# Patient Record
Sex: FEMALE | Race: BLACK OR AFRICAN AMERICAN | NOT HISPANIC OR LATINO | URBAN - METROPOLITAN AREA
[De-identification: names, ages, dates, MRNs, and addresses within clinical notes are randomized per-mention and may not be internally consistent; named-entity substitution may affect disease eponyms.]

---

## 2024-08-09 ENCOUNTER — PATIENT MESSAGE (OUTPATIENT)
Dept: UROGYNECOLOGY | Facility: CLINIC | Age: 69
End: 2024-08-09
Payer: MEDICARE

## 2024-08-13 ENCOUNTER — PATIENT MESSAGE (OUTPATIENT)
Dept: UROGYNECOLOGY | Facility: CLINIC | Age: 69
End: 2024-08-13
Payer: MEDICARE

## 2024-08-14 ENCOUNTER — OFFICE VISIT (OUTPATIENT)
Dept: UROGYNECOLOGY | Facility: CLINIC | Age: 69
End: 2024-08-14
Payer: MEDICARE

## 2024-08-14 VITALS
HEART RATE: 94 BPM | HEIGHT: 65 IN | BODY MASS INDEX: 48.32 KG/M2 | DIASTOLIC BLOOD PRESSURE: 77 MMHG | SYSTOLIC BLOOD PRESSURE: 154 MMHG | WEIGHT: 290 LBS

## 2024-08-14 DIAGNOSIS — N95.2 ATROPHIC VAGINITIS: ICD-10-CM

## 2024-08-14 DIAGNOSIS — N39.46 MIXED INCONTINENCE URGE AND STRESS: ICD-10-CM

## 2024-08-14 DIAGNOSIS — N81.3 UTEROVAGINAL PROLAPSE, COMPLETE: Primary | ICD-10-CM

## 2024-08-14 DIAGNOSIS — N81.10 PROLAPSE OF ANTERIOR VAGINAL WALL: ICD-10-CM

## 2024-08-14 DIAGNOSIS — R39.15 URINARY URGENCY: ICD-10-CM

## 2024-08-14 PROCEDURE — 3288F FALL RISK ASSESSMENT DOCD: CPT | Mod: CPTII,S$GLB,, | Performed by: OBSTETRICS & GYNECOLOGY

## 2024-08-14 PROCEDURE — 87086 URINE CULTURE/COLONY COUNT: CPT | Performed by: OBSTETRICS & GYNECOLOGY

## 2024-08-14 PROCEDURE — 99204 OFFICE O/P NEW MOD 45 MIN: CPT | Mod: S$GLB,,, | Performed by: OBSTETRICS & GYNECOLOGY

## 2024-08-14 PROCEDURE — 3077F SYST BP >= 140 MM HG: CPT | Mod: CPTII,S$GLB,, | Performed by: OBSTETRICS & GYNECOLOGY

## 2024-08-14 PROCEDURE — 3078F DIAST BP <80 MM HG: CPT | Mod: CPTII,S$GLB,, | Performed by: OBSTETRICS & GYNECOLOGY

## 2024-08-14 PROCEDURE — 99999 PR PBB SHADOW E&M-EST. PATIENT-LVL V: CPT | Mod: PBBFAC,,, | Performed by: OBSTETRICS & GYNECOLOGY

## 2024-08-14 PROCEDURE — 1159F MED LIST DOCD IN RCRD: CPT | Mod: CPTII,S$GLB,, | Performed by: OBSTETRICS & GYNECOLOGY

## 2024-08-14 PROCEDURE — 3008F BODY MASS INDEX DOCD: CPT | Mod: CPTII,S$GLB,, | Performed by: OBSTETRICS & GYNECOLOGY

## 2024-08-14 PROCEDURE — 1126F AMNT PAIN NOTED NONE PRSNT: CPT | Mod: CPTII,S$GLB,, | Performed by: OBSTETRICS & GYNECOLOGY

## 2024-08-14 PROCEDURE — 4010F ACE/ARB THERAPY RXD/TAKEN: CPT | Mod: CPTII,S$GLB,, | Performed by: OBSTETRICS & GYNECOLOGY

## 2024-08-14 PROCEDURE — 1101F PT FALLS ASSESS-DOCD LE1/YR: CPT | Mod: CPTII,S$GLB,, | Performed by: OBSTETRICS & GYNECOLOGY

## 2024-08-14 RX ORDER — IRON,CARBONYL/ASCORBIC ACID 100-250 MG
1 TABLET ORAL DAILY
COMMUNITY

## 2024-08-14 RX ORDER — LEVOTHYROXINE SODIUM 175 UG/1
1 TABLET ORAL DAILY
COMMUNITY
Start: 2023-11-09

## 2024-08-14 RX ORDER — CETIRIZINE HYDROCHLORIDE 10 MG/1
1 TABLET ORAL EVERY EVENING
COMMUNITY
Start: 2023-10-05

## 2024-08-14 RX ORDER — CYCLOBENZAPRINE HCL 10 MG
TABLET ORAL
COMMUNITY
Start: 2024-05-13

## 2024-08-14 RX ORDER — LOSARTAN POTASSIUM 50 MG/1
1 TABLET ORAL DAILY
COMMUNITY
Start: 2023-10-09

## 2024-08-14 RX ORDER — FLUTICASONE PROPIONATE 50 MCG
SPRAY, SUSPENSION (ML) NASAL
COMMUNITY
Start: 2024-02-05

## 2024-08-14 RX ORDER — CARVEDILOL 3.12 MG/1
TABLET ORAL
COMMUNITY

## 2024-08-14 RX ORDER — ALBUTEROL SULFATE 90 UG/1
INHALANT RESPIRATORY (INHALATION)
COMMUNITY
Start: 2024-06-28

## 2024-08-14 RX ORDER — METFORMIN HYDROCHLORIDE 500 MG/1
TABLET ORAL
COMMUNITY
Start: 2023-10-19

## 2024-08-14 RX ORDER — BLOOD-GLUCOSE METER
EACH MISCELLANEOUS
COMMUNITY
Start: 2024-08-05

## 2024-08-14 RX ORDER — BLOOD SUGAR DIAGNOSTIC
STRIP MISCELLANEOUS
COMMUNITY
Start: 2024-08-07

## 2024-08-14 RX ORDER — ASPIRIN 325 MG
1 TABLET, DELAYED RELEASE (ENTERIC COATED) ORAL
COMMUNITY

## 2024-08-14 RX ORDER — ACETAMINOPHEN AND CODEINE PHOSPHATE 300; 30 MG/1; MG/1
1 TABLET ORAL EVERY 6 HOURS PRN
COMMUNITY
Start: 2024-08-03

## 2024-08-14 RX ORDER — LANCETS
EACH MISCELLANEOUS
COMMUNITY
Start: 2024-03-14

## 2024-08-14 RX ORDER — ATORVASTATIN CALCIUM 20 MG/1
TABLET, FILM COATED ORAL
COMMUNITY
Start: 2024-02-08

## 2024-08-14 RX ORDER — FUROSEMIDE 20 MG/1
TABLET ORAL
COMMUNITY

## 2024-08-14 NOTE — PROGRESS NOTES
Subjective:       Patient ID: Felicity Culp is a 68 y.o. female.    Chief Complaint:  Vaginal Prolapse        History of Present Illness  HPI 68 y.o.  female No obstetric history on file.   has no past medical history on file.    Refereed by Dr. Patel for evaluation of prolapse which has been an issue for the past three or 4 years.     1)  UI:  (+) ONI  (+) UUI  -  (+) pads:.  Daytime frequency: Q 4 -5 hours.  Nocturia: yes 3-4 trines :    (-) dysuria-  (--) hematuria,  (--) frequent UTIs.  (+) incomplete bladder emptying.     2)  POP:  .   Symptoms:(+)  .  (--) vaginal bleeding. (--) vaginal discharge. (--) sexually active.  (+) dyspareunia when she was sexually active.  (--)  Vaginal dryness.  (--) vaginal estrogen use.     3)  BM:  (+) constipation/straining.  (--) chronic diarrhea. (--) hematochezia.  (--) fecal incontinence.  (--) fecal smearing/urgency.  (--) incomplete evacuation.        GYN & OB History  No LMP recorded.   Date of Last Pap: No result found    OB History   No obstetric history on file.     OB     x 0.  C/s x 0.    Largest: 7 # 12 oz     GYN  Menarche:  11  Menstrual cycle:   Menopause:  50's ?  Hysterectomy:  No   Ovaries:  Removed   Tubal ligation:  No  Other abdominal surgeries: n/a      No past medical history on file.  Past Surgical History:   Procedure Laterality Date    APPENDECTOMY  2005    CARPAL TUNNEL RELEASE      SPINAL FUSION         Review of Systems  Review of Systems   Constitutional: Negative.  Negative for activity change, appetite change, chills, diaphoresis, fatigue, fever and unexpected weight change.   HENT: Negative.     Eyes: Negative.    Respiratory: Negative.  Negative for cough.    Cardiovascular: Negative.    Gastrointestinal:  Positive for constipation. Negative for abdominal distention, abdominal pain, anal bleeding, blood in stool, diarrhea, nausea, rectal pain and vomiting.   Endocrine: Negative.    Genitourinary:  Positive for difficulty urinating.  Negative for decreased urine volume, dyspareunia, dysuria, enuresis, flank pain, frequency, genital sores, hematuria, menstrual problem, pelvic pain, urgency, vaginal bleeding, vaginal discharge and vaginal pain.        Prolapse  + vaginal bulge   +vaginal pressure    Musculoskeletal:  Negative for back pain.   Skin:  Negative for color change, pallor, rash and wound.   Allergic/Immunologic: Negative for environmental allergies, food allergies and immunocompromised state.   Hematological:  Negative for adenopathy. Does not bruise/bleed easily.   Psychiatric/Behavioral:  Negative for agitation, behavioral problems, confusion and sleep disturbance.            Objective:     Physical Exam   Constitutional: She is oriented to person, place, and time. She appears well-developed.   HENT:   Head: Normocephalic and atraumatic.   Eyes: Conjunctivae and EOM are normal.   Cardiovascular: Normal rate, regular rhythm, S1 normal, S2 normal, normal heart sounds and intact distal pulses.   Pulmonary/Chest: Effort normal and breath sounds normal. She exhibits no tenderness.   Abdominal: Soft. Bowel sounds are normal. She exhibits no distension and no mass. There is no splenomegaly or hepatomegaly. There is no abdominal tenderness. There is no rigidity, no rebound and no guarding. No hernia.   Genitourinary: Pelvic exam was performed with patient supine. Rectum normal, vagina normal, uterus normal, cervix normal, skenes normal and bartholins normal. Right labia normal and left labia normal. Urethra exhibits hypermobility. Urethra exhibits no urethral caruncle, no urethral diverticulum and no urethral mass. Right bartholin is not enlarged and not tender. Left bartholin is not enlarged and not tender. Rectal exam shows resting tone normal and active tone normal. Rectal exam shows no external hemorrhoid, no fissure, no tenderness, anal tone normal and no dovetailing. Guaiac negative stool. There is a rectocele, a cystocele, unspecified  prolapse of vaginal walls and atrophy in the vagina. No foreign body, tenderness, bleeding, fistula, mesh exposure or lavator tenderness in the vagina. Right adnexum displays no mass and no tenderness. Left adnexum displays no mass and no tenderness. Cervix exhibits no motion tenderness and no discharge. Uterus is not tender and not experiencing uterine prolapse.   PVR: 150 ML  Empty cough stress test: Negative.  Kegel: 1/5    POP-Q  Aa: 0 Ba: 4 C: 4   GH: 5 PB: 3 TVL: 9   Ap: 0 Bp: 0 D: -2                      Genitourinary Comments: Cervix is very bulky, making the vaginal approach challenging      Musculoskeletal:         General: Normal range of motion.      Cervical back: Normal range of motion and neck supple.   Neurological: She is alert and oriented to person, place, and time. She has normal strength, normal reflexes and intact cranial nerves (2-12). Cranial nerves II through XII intact. No cranial nerve deficit.   Skin: Skin is warm and dry.   Psychiatric: She has a normal mood and affect. Her speech is normal and behavior is normal. Judgment normal.              Assessment:        1. Uterovaginal prolapse, complete    2. Urinary urgency    3. Mixed incontinence urge and stress                Plan:    1. Prolapse: Stage 2 apical prolapse, Stage 2 anterior and posterior vaginal wall prolapse   --Pessary benefit discussed with patient, will schedule for pessary fitting- until surgery    --Daily pelvic floor exercises as assigned, Pelvic floor PT   --Non surgical options discussed with patient    --Surgical options discussed such as :  with apical suspension: SSF, USLS and SCP with mesh augmentation.  The possible need for an anterior or posterior colporrhaphy was discussed.  We discussed pro's and con's of a native tissue repair vs an augmented repair with mesh. Risks/ benefits/ alternatives/ succuss and failure rates were reviewed with the various surgical treatment options. Information packets were given  detailing surgical options.  She was also given web site information for: www.augs.org and www.ERPLYsforpfd.org and http://www.fda.gov/MedicalDevices/UroGynSurgicalMesh/default.htm to review the details of the surgical options discussed and the use of mesh in surgery. She will review the information given and we will discuss further at the follow up visit. Cervix is very bulky, making the the vaginal approach challenging but given her BMI, I recommend [facility] Transvaginal sonogram.     2.  Mixed urinary incontinence, urge > stress:   --Bladder diary for 3-7 days, write the number of times you go to the rest room and associated symptoms of urgency or leakage.   --Empty bladder every 3 hours.  Empty well: wait a minute, lean forward on toilet.    --Avoid dietary irritants (see sheet).  Keep diary x 3-5 days to determine your irritants.  --KEGELS: do 10 in AM and 10 in PM, holding each x 10 seconds.  When you feel urge to go, STOP, KEGEL, and when urge has passed, then go to bathroom.  --URGE: Myrbetriq 25 mg daily.  SE profile reviewed.    Takes 2-4 weeks to see if will have effect.  For dry mouth: get sour, sugar free lozenge or gum.    --STRESS:  Non surgical options: Pessary vs. Impressa vs Rivive - which represent urethral and bladder support devices; Surgical options including 1.  mid urethral sling; retropubic, transobturator vs single incision 2. Fascial slings 3. Cohn procedure 4. Periurethral bulking     3.   Urinary retention:  --Renal sonogram   --Double void and Crede maneuvers discussed  --Repeat PVR at the next visit        Approximately 60 minutes of total time spent in consult, 75 % in discussion. This includes face to face time and non-face to face time preparing to see the patient, obtaining and/or reviewing separately obtained history, documenting clinical information in the electronic or other health record, independently interpreting results (not  separately reported) and communicating results to the patient/family/caregiver, or care coordination (not separately reported).      Thank you for requesting consultation of your patient.  I look forward to participating in her care.    Tabby Hinojosa DO  Female Pelvic Medicine and Reconstructive Surgery  [facility] [place], [place]

## 2024-08-16 LAB — BACTERIA UR CULT: NO GROWTH

## 2024-08-19 ENCOUNTER — PATIENT MESSAGE (OUTPATIENT)
Dept: UROGYNECOLOGY | Facility: CLINIC | Age: 69
End: 2024-08-19
Payer: MEDICARE

## 2024-08-20 ENCOUNTER — HOSPITAL ENCOUNTER (OUTPATIENT)
Dept: RADIOLOGY | Facility: HOSPITAL | Age: 69
Discharge: HOME | End: 2024-08-20
Attending: OBSTETRICS & GYNECOLOGY
Payer: MEDICARE

## 2024-08-20 DIAGNOSIS — N81.3 UTEROVAGINAL PROLAPSE, COMPLETE: ICD-10-CM

## 2024-08-20 DIAGNOSIS — R39.15 URINARY URGENCY: ICD-10-CM

## 2024-08-20 PROCEDURE — 76770 US EXAM ABDO BACK WALL COMP: CPT | Mod: TC

## 2024-08-20 PROCEDURE — 71045 X-RAY EXAM CHEST 1 VIEW: CPT | Mod: 26,,, | Performed by: RADIOLOGY

## 2024-08-20 PROCEDURE — 76830 TRANSVAGINAL US NON-OB: CPT | Mod: 26,,, | Performed by: RADIOLOGY

## 2024-08-20 PROCEDURE — 76856 US EXAM PELVIC COMPLETE: CPT | Mod: 26,,, | Performed by: RADIOLOGY

## 2024-08-20 PROCEDURE — 76770 US EXAM ABDO BACK WALL COMP: CPT | Mod: 26,,, | Performed by: RADIOLOGY

## 2024-08-20 PROCEDURE — 76856 US EXAM PELVIC COMPLETE: CPT | Mod: TC

## 2024-08-20 PROCEDURE — 71045 X-RAY EXAM CHEST 1 VIEW: CPT | Mod: TC

## 2024-08-20 PROCEDURE — 76830 TRANSVAGINAL US NON-OB: CPT | Mod: TC

## 2024-09-03 ENCOUNTER — PATIENT MESSAGE (OUTPATIENT)
Dept: UROGYNECOLOGY | Facility: CLINIC | Age: 69
End: 2024-09-03
Payer: MEDICARE

## 2024-09-03 ENCOUNTER — TELEPHONE (OUTPATIENT)
Dept: UROGYNECOLOGY | Facility: CLINIC | Age: 69
End: 2024-09-03
Payer: MEDICARE

## 2024-09-03 DIAGNOSIS — R91.1 SOLITARY PULMONARY NODULE: Primary | ICD-10-CM

## 2024-09-05 ENCOUNTER — TELEPHONE (OUTPATIENT)
Dept: UROGYNECOLOGY | Facility: CLINIC | Age: 69
End: 2024-09-05
Payer: MEDICARE

## 2024-09-05 NOTE — TELEPHONE ENCOUNTER
Home number is not available , left message on cell to return call to set up pulmonary referral and schedule ct scan of chest.

## 2024-09-09 ENCOUNTER — HOSPITAL ENCOUNTER (OUTPATIENT)
Dept: RADIOLOGY | Facility: HOSPITAL | Age: 69
Discharge: HOME | End: 2024-09-09
Attending: OBSTETRICS & GYNECOLOGY
Payer: MEDICARE

## 2024-09-09 DIAGNOSIS — R91.1 SOLITARY PULMONARY NODULE: ICD-10-CM

## 2024-09-09 PROCEDURE — 71250 CT THORAX DX C-: CPT | Mod: TC

## 2024-09-09 PROCEDURE — 71250 CT THORAX DX C-: CPT | Mod: 26,,, | Performed by: RADIOLOGY

## 2024-09-18 ENCOUNTER — OFFICE VISIT (OUTPATIENT)
Dept: PULMONOLOGY | Facility: CLINIC | Age: 69
End: 2024-09-18
Payer: MEDICARE

## 2024-09-18 VITALS
BODY MASS INDEX: 48.82 KG/M2 | DIASTOLIC BLOOD PRESSURE: 80 MMHG | SYSTOLIC BLOOD PRESSURE: 134 MMHG | HEIGHT: 65 IN | HEART RATE: 75 BPM | OXYGEN SATURATION: 97 % | WEIGHT: 293 LBS

## 2024-09-18 DIAGNOSIS — R91.1 SOLITARY PULMONARY NODULE: ICD-10-CM

## 2024-09-18 PROCEDURE — 3288F FALL RISK ASSESSMENT DOCD: CPT | Mod: CPTII,S$GLB,, | Performed by: INTERNAL MEDICINE

## 2024-09-18 PROCEDURE — 99999 PR PBB SHADOW E&M-EST. PATIENT-LVL V: CPT | Mod: PBBFAC,,, | Performed by: INTERNAL MEDICINE

## 2024-09-18 PROCEDURE — 1159F MED LIST DOCD IN RCRD: CPT | Mod: CPTII,S$GLB,, | Performed by: INTERNAL MEDICINE

## 2024-09-18 PROCEDURE — 4010F ACE/ARB THERAPY RXD/TAKEN: CPT | Mod: CPTII,S$GLB,, | Performed by: INTERNAL MEDICINE

## 2024-09-18 PROCEDURE — 3079F DIAST BP 80-89 MM HG: CPT | Mod: CPTII,S$GLB,, | Performed by: INTERNAL MEDICINE

## 2024-09-18 PROCEDURE — 3075F SYST BP GE 130 - 139MM HG: CPT | Mod: CPTII,S$GLB,, | Performed by: INTERNAL MEDICINE

## 2024-09-18 PROCEDURE — 3008F BODY MASS INDEX DOCD: CPT | Mod: CPTII,S$GLB,, | Performed by: INTERNAL MEDICINE

## 2024-09-18 PROCEDURE — 99203 OFFICE O/P NEW LOW 30 MIN: CPT | Mod: S$GLB,,, | Performed by: INTERNAL MEDICINE

## 2024-09-18 PROCEDURE — 1101F PT FALLS ASSESS-DOCD LE1/YR: CPT | Mod: CPTII,S$GLB,, | Performed by: INTERNAL MEDICINE

## 2024-09-18 RX ORDER — CLOPIDOGREL BISULFATE 75 MG/1
75 TABLET ORAL
COMMUNITY

## 2024-09-18 RX ORDER — ISOPROPYL ALCOHOL 70 ML/100ML
SWAB TOPICAL
COMMUNITY
Start: 2024-06-12

## 2024-09-18 NOTE — PATIENT INSTRUCTIONS
Ct chest with 19 mm lung nodule.  Risk cancer per model close to 40%.    Would ask staff to get old chest ct and xray reports -- nodule should have drawn attention if seen in past.      Would need to pursue  evaluation to try to be sure not cancer.  Pet scan needed given size-- may need biopsy, we discuss needle biopsy    Will do pet scan and notify by phone.    If cancer concerns high still-- may arrange needle biopsy.    Would otherwise recheck ct for growth in 3 months or so.......

## 2024-09-18 NOTE — PROGRESS NOTES
"9/18/2024    Felicity Culp  New Patient Consult    Chief Complaint   Patient presents with    Pulmonary Nodules       HPI: pt non smoker, had cxr in Aug and ct chest with 19 mm rml nodule.   [facility] system viewed -- no old report found....         PFSH:  No past medical history on file.      Past Surgical History:   Procedure Laterality Date    APPENDECTOMY  2005    CARPAL TUNNEL RELEASE  2003    SPINAL FUSION  2015     Social History     Tobacco Use    Smoking status: Never    Smokeless tobacco: Never     No family history on file.  Review of patient's allergies indicates:  No Known Allergies       Review of Systems:  a review of eleven systems covering constitutional, Eye, HEENT, Psych, Respiratory, Cardiac, GI, , Musculoskeletal, Endocrine, Dermatologic was negative except for pertinent findings as listed ABOVE and below: pertinent positives as above, rest good        Exam:Comprehensive exam done. /80 (BP Location: Right arm, Patient Position: Sitting, BP Method: Large (Automatic))   Pulse 75   Ht 5' 5" (1.651 m)   Wt 133.1 kg (293 lb 6.9 oz)   SpO2 97% Comment: on room air at rest  BMI 48.83 kg/m²   Exam included Vitals as listed, and patient's appearance and affect and alertness and mood, oral exam for yeast and hygiene and pharynx lesions and Mallapatti (M) score, neck with inspection for jvd and masses and thyroid abnormalities and lymph nodes (supraclavicular and infraclavicular nodes and axillary also examined and noted if abn), chest exam included symmetry and effort and fremitus and percussion and auscultation, cardiac exam included rhythm and gallops and murmur and rubs and jvd and edema, abdominal exam for mass and hepatosplenomegaly and tenderness and hernias and bowel sounds, Musculoskeletal exam with muscle tone and posture and mobility/gait and  strength, and skin for rashes and cyanosis and pallor and turgor, extremity for clubbing.  Findings were normal except for pertinent " "findings listed below:  M4, morbid, chest is symmetric, no distress, normal percussion, normal fremitus and good normal breath sounds           Radiographs (ct chest and cxr) reviewed: view by direct vision      Labs reviewed           PFT was not done       Plan:  Clinical impression is apparently straight forward and impression with management as below.    Felicity Culp" was seen today for pulmonary nodules.    Diagnoses and all orders for this visit:    Solitary pulmonary nodule  -     Ambulatory referral/consult to Pulmonology  -     NM PET CT FDG Skull Base to Mid Thigh; Future        Follow up in about 4 months (around 1/18/2025).    Discussed with patient above for education the following:      Patient Instructions   Ct chest with 19 mm lung nodule.  Risk cancer per model close to 40%.    Would ask staff to get old chest ct and xray reports -- nodule should have drawn attention if seen in past.      Would need to pursue  evaluation to try to be sure not cancer.  Pet scan needed given size-- may need biopsy, we discuss needle biopsy    Will do pet scan and notify by phone.    If cancer concerns high still-- may arrange needle biopsy.    Would otherwise recheck ct for growth in 3 months or so.......    "

## 2024-09-19 ENCOUNTER — TELEPHONE (OUTPATIENT)
Dept: UROGYNECOLOGY | Facility: CLINIC | Age: 69
End: 2024-09-19
Payer: MEDICARE

## 2024-09-19 NOTE — TELEPHONE ENCOUNTER
Attempted to return patient phone call to reschedule procedure. No answer, LVM requesting call back.

## 2024-09-19 NOTE — TELEPHONE ENCOUNTER
----- Message from Deyanira Daley MA sent at 9/19/2024  4:27 PM CDT -----    ----- Message -----  From: Lizette Castillo MA  Sent: 9/19/2024   4:18 PM CDT  To: Deyanira Daley MA    This is for a [facility] pt. Needs to be sent to [facility].  ----- Message -----  From: Deyanira Daley MA  Sent: 9/19/2024   4:17 PM CDT  To: Lizette Castillo MA    Was this for surgery?  ----- Message -----  From: Woodrow Melara  Sent: 9/19/2024   4:11 PM CDT  To: [facility] Staff        Name of Who is Calling: THOMAS GURVINDER ANDERSON [[id number]]      What is the request in detail: Pt returned call regarding rescheduling procedure.Please contact to further discuss and advise.          Can the clinic reply by [facility]: Y      What Number to Call Back if not in [facility]:  [phone]

## 2024-09-19 NOTE — TELEPHONE ENCOUNTER
Informed patient, Dr Hinojosa is wanting to get the SUDS done at [facility].Please reschedule to [facility] for urodynamics.

## 2024-09-25 ENCOUNTER — PROCEDURE VISIT (OUTPATIENT)
Dept: UROGYNECOLOGY | Facility: CLINIC | Age: 69
End: 2024-09-25
Payer: MEDICARE

## 2024-09-25 VITALS
WEIGHT: 291.25 LBS | BODY MASS INDEX: 48.53 KG/M2 | DIASTOLIC BLOOD PRESSURE: 70 MMHG | SYSTOLIC BLOOD PRESSURE: 145 MMHG | HEIGHT: 65 IN

## 2024-09-25 DIAGNOSIS — N39.46 MIXED INCONTINENCE URGE AND STRESS: ICD-10-CM

## 2024-09-25 LAB
BILIRUB SERPL-MCNC: NORMAL MG/DL
BLOOD URINE, POC: NORMAL
CLARITY, POC UA: CLEAR
COLOR, POC UA: NORMAL
GLUCOSE UR QL STRIP: NORMAL
KETONES UR QL STRIP: NORMAL
LEUKOCYTE ESTERASE URINE, POC: NORMAL
NITRITE, POC UA: NORMAL
PH, POC UA: 6
PROTEIN, POC: NORMAL
SPECIFIC GRAVITY, POC UA: 1.01
UROBILINOGEN, POC UA: NORMAL

## 2024-09-25 PROCEDURE — 51728 CYSTOMETROGRAM W/VP: CPT | Mod: S$GLB,,, | Performed by: OBSTETRICS & GYNECOLOGY

## 2024-09-25 PROCEDURE — 81002 URINALYSIS NONAUTO W/O SCOPE: CPT | Mod: S$GLB,,, | Performed by: OBSTETRICS & GYNECOLOGY

## 2024-09-25 PROCEDURE — 51741 ELECTRO-UROFLOWMETRY FIRST: CPT | Mod: S$GLB,,, | Performed by: OBSTETRICS & GYNECOLOGY

## 2024-09-25 PROCEDURE — 51784 ANAL/URINARY MUSCLE STUDY: CPT | Mod: S$GLB,,, | Performed by: OBSTETRICS & GYNECOLOGY

## 2024-09-25 PROCEDURE — 51797 INTRAABDOMINAL PRESSURE TEST: CPT | Mod: S$GLB,,, | Performed by: OBSTETRICS & GYNECOLOGY

## 2024-09-25 RX ORDER — CIPROFLOXACIN 500 MG/1
500 TABLET ORAL
Status: COMPLETED | OUTPATIENT
Start: 2024-09-25 | End: 2024-09-25

## 2024-09-25 RX ADMIN — CIPROFLOXACIN 500 MG: 500 TABLET ORAL at 11:09

## 2024-10-01 ENCOUNTER — PATIENT MESSAGE (OUTPATIENT)
Dept: PULMONOLOGY | Facility: CLINIC | Age: 69
End: 2024-10-01
Payer: MEDICARE

## 2024-10-11 DIAGNOSIS — Z01.818 PRE-OP TESTING: Primary | ICD-10-CM

## 2024-10-16 ENCOUNTER — CLINICAL SUPPORT (OUTPATIENT)
Dept: UROGYNECOLOGY | Facility: CLINIC | Age: 69
End: 2024-10-16
Payer: MEDICARE

## 2024-10-16 DIAGNOSIS — N81.3 UTEROVAGINAL PROLAPSE, COMPLETE: Primary | ICD-10-CM

## 2024-10-16 PROCEDURE — 99499 UNLISTED E&M SERVICE: CPT | Mod: S$GLB,,, | Performed by: OBSTETRICS & GYNECOLOGY

## 2024-10-16 NOTE — PROGRESS NOTES
Arrived to floor. Dr Hinojosa to talk to patient , wants to hold off surgery till  Pulmonary is cleared.

## 2024-10-21 ENCOUNTER — HOSPITAL ENCOUNTER (OUTPATIENT)
Dept: RADIOLOGY | Facility: HOSPITAL | Age: 69
Discharge: HOME | End: 2024-10-21
Attending: INTERNAL MEDICINE
Payer: MEDICARE

## 2024-10-21 VITALS — BODY MASS INDEX: 47.15 KG/M2 | WEIGHT: 283 LBS | HEIGHT: 65 IN

## 2024-10-21 DIAGNOSIS — R91.1 SOLITARY PULMONARY NODULE: ICD-10-CM

## 2024-10-21 DIAGNOSIS — R94.8 ABNORMAL POSITRON EMISSION TOMOGRAPHY (PET) SCAN: ICD-10-CM

## 2024-10-21 DIAGNOSIS — R94.2 ABNORMAL PET SCAN OF LUNG: Primary | ICD-10-CM

## 2024-10-21 LAB — POCT GLUCOSE: 95 MG/DL (ref 70–110)

## 2024-10-21 PROCEDURE — 78815 PET IMAGE W/CT SKULL-THIGH: CPT | Mod: TC,PS,PO

## 2024-10-21 PROCEDURE — 82962 GLUCOSE BLOOD TEST: CPT | Mod: PO

## 2024-10-21 PROCEDURE — 78815 PET IMAGE W/CT SKULL-THIGH: CPT | Mod: 26,PI,, | Performed by: RADIOLOGY

## 2024-10-21 PROCEDURE — A9552 F18 FDG: HCPCS | Mod: PO | Performed by: INTERNAL MEDICINE

## 2024-10-21 RX ORDER — FLUDEOXYGLUCOSE F18 500 MCI/ML
12.5 INJECTION INTRAVENOUS
Status: COMPLETED | OUTPATIENT
Start: 2024-10-21 | End: 2024-10-21

## 2024-10-21 RX ADMIN — FLUDEOXYGLUCOSE F-18 12.5 MILLICURIE: 500 INJECTION INTRAVENOUS at 02:10

## 2024-10-21 NOTE — PROGRESS NOTES
Pt had pet active right  lung and left supraclavicular node-- ct bx ordered for each.  Lymph node alone may be adequate.  Arrange bx.  Dr Zayas called but no answer.... left message.  Could do appt to discuss....

## 2024-10-23 ENCOUNTER — TELEPHONE (OUTPATIENT)
Dept: RADIOLOGY | Facility: HOSPITAL | Age: 69
End: 2024-10-23

## 2024-10-23 DIAGNOSIS — R59.1 LYMPHADENOPATHY: Primary | ICD-10-CM

## 2024-10-23 NOTE — NURSING
Pre procedure instructions for lung and lymph node biopsy given to pt ,verbalized understanding, to arrive at 7 am on 11/12/24, npo after mn, to stop her plavix 5 days prior to her procedure per md order, may take her coreg and losartan that morning with water only, will have family to drive her home.

## 2024-10-25 ENCOUNTER — TELEPHONE (OUTPATIENT)
Dept: UROGYNECOLOGY | Facility: CLINIC | Age: 69
End: 2024-10-25
Payer: MEDICARE

## 2024-10-25 NOTE — TELEPHONE ENCOUNTER
Pt called to cancel pre-op appointments because her surgery had been postponed. Pre-op appointments are cancelled. Call ended.        ----- Message from Leda sent at 10/25/2024 11:10 AM CDT -----  Type:  Needs Medical Advice    Who Called: pt    Symptoms (please be specific): na     How long has patient had these symptoms:  na    Pharmacy name and phone #:  na    Would the patient rather a call back or a response via [facility]? Call back    Best Call Back Number: [phone]      Additional Information: pt needs to speak with nurse about upcoming appts      Please call Back to advise. Thanks!

## 2024-11-12 ENCOUNTER — HOSPITAL ENCOUNTER (OUTPATIENT)
Dept: RADIOLOGY | Facility: HOSPITAL | Age: 69
Discharge: HOME | End: 2024-11-12
Attending: INTERNAL MEDICINE
Payer: MEDICARE

## 2024-11-12 ENCOUNTER — HOSPITAL ENCOUNTER (OUTPATIENT)
Dept: RADIOLOGY | Facility: HOSPITAL | Age: 69
Discharge: HOME | End: 2024-11-12
Attending: RADIOLOGY
Payer: MEDICARE

## 2024-11-12 VITALS
RESPIRATION RATE: 16 BRPM | SYSTOLIC BLOOD PRESSURE: 127 MMHG | OXYGEN SATURATION: 98 % | HEART RATE: 74 BPM | DIASTOLIC BLOOD PRESSURE: 50 MMHG | TEMPERATURE: 98 F

## 2024-11-12 DIAGNOSIS — R59.1 LYMPHADENOPATHY: ICD-10-CM

## 2024-11-12 DIAGNOSIS — R94.2 ABNORMAL PET SCAN OF LUNG: ICD-10-CM

## 2024-11-12 LAB
APTT PPP: 30 SEC (ref 21–32)
BASOPHILS # BLD AUTO: 0.02 K/UL (ref 0–0.2)
BASOPHILS NFR BLD: 0.5 % (ref 0–1.9)
DIFFERENTIAL METHOD BLD: ABNORMAL
EOSINOPHIL # BLD AUTO: 0.1 K/UL (ref 0–0.5)
EOSINOPHIL NFR BLD: 2.4 % (ref 0–8)
ERYTHROCYTE [DISTWIDTH] IN BLOOD BY AUTOMATED COUNT: 13.6 % (ref 11.5–14.5)
HCT VFR BLD AUTO: 34.5 % (ref 37–48.5)
HGB BLD-MCNC: 10.8 G/DL (ref 12–16)
IMM GRANULOCYTES # BLD AUTO: 0.02 K/UL (ref 0–0.04)
IMM GRANULOCYTES NFR BLD AUTO: 0.5 % (ref 0–0.5)
INR PPP: 1 (ref 0.8–1.2)
LYMPHOCYTES # BLD AUTO: 1 K/UL (ref 1–4.8)
LYMPHOCYTES NFR BLD: 24.1 % (ref 18–48)
MCH RBC QN AUTO: 27.9 PG (ref 27–31)
MCHC RBC AUTO-ENTMCNC: 31.3 G/DL (ref 32–36)
MCV RBC AUTO: 89 FL (ref 82–98)
MONOCYTES # BLD AUTO: 0.4 K/UL (ref 0.3–1)
MONOCYTES NFR BLD: 8.5 % (ref 4–15)
NEUTROPHILS # BLD AUTO: 2.7 K/UL (ref 1.8–7.7)
NEUTROPHILS NFR BLD: 64 % (ref 38–73)
NRBC BLD-RTO: 0 /100 WBC
PLATELET # BLD AUTO: 263 K/UL (ref 150–450)
PMV BLD AUTO: 10.9 FL (ref 9.2–12.9)
PROTHROMBIN TIME: 11.6 SEC (ref 9–12.5)
RBC # BLD AUTO: 3.87 M/UL (ref 4–5.4)
WBC # BLD AUTO: 4.24 K/UL (ref 3.9–12.7)

## 2024-11-12 PROCEDURE — 63600175 PHARM REV CODE 636 W HCPCS: Performed by: RADIOLOGY

## 2024-11-12 PROCEDURE — 27200940 CT BIOPSY LUNG W/ GUIDANCE

## 2024-11-12 PROCEDURE — 25000003 PHARM REV CODE 250: Performed by: RADIOLOGY

## 2024-11-12 PROCEDURE — 99153 MOD SED SAME PHYS/QHP EA: CPT

## 2024-11-12 PROCEDURE — 88341 IMHCHEM/IMCYTCHM EA ADD ANTB: CPT | Mod: TC | Performed by: PATHOLOGY

## 2024-11-12 PROCEDURE — 71045 X-RAY EXAM CHEST 1 VIEW: CPT | Mod: 26,,, | Performed by: RADIOLOGY

## 2024-11-12 PROCEDURE — 99152 MOD SED SAME PHYS/QHP 5/>YRS: CPT

## 2024-11-12 PROCEDURE — 85730 THROMBOPLASTIN TIME PARTIAL: CPT | Performed by: RADIOLOGY

## 2024-11-12 PROCEDURE — 85610 PROTHROMBIN TIME: CPT | Performed by: RADIOLOGY

## 2024-11-12 PROCEDURE — 85025 COMPLETE CBC W/AUTO DIFF WBC: CPT | Performed by: RADIOLOGY

## 2024-11-12 PROCEDURE — A4215 STERILE NEEDLE: HCPCS

## 2024-11-12 PROCEDURE — 71045 X-RAY EXAM CHEST 1 VIEW: CPT | Mod: TC

## 2024-11-12 RX ORDER — LIDOCAINE HYDROCHLORIDE 10 MG/ML
INJECTION, SOLUTION EPIDURAL; INFILTRATION; INTRACAUDAL; PERINEURAL
Status: COMPLETED | OUTPATIENT
Start: 2024-11-12 | End: 2024-11-12

## 2024-11-12 RX ORDER — MIDAZOLAM HYDROCHLORIDE 1 MG/ML
INJECTION, SOLUTION INTRAMUSCULAR; INTRAVENOUS
Status: COMPLETED | OUTPATIENT
Start: 2024-11-12 | End: 2024-11-12

## 2024-11-12 RX ORDER — FERROUS SULFATE 325(65) MG
325 TABLET ORAL
COMMUNITY

## 2024-11-12 RX ORDER — FENTANYL CITRATE 50 UG/ML
INJECTION, SOLUTION INTRAMUSCULAR; INTRAVENOUS
Status: COMPLETED | OUTPATIENT
Start: 2024-11-12 | End: 2024-11-12

## 2024-11-12 RX ORDER — SODIUM CHLORIDE 9 MG/ML
INJECTION, SOLUTION INTRAVENOUS
Status: COMPLETED | OUTPATIENT
Start: 2024-11-12 | End: 2024-11-12

## 2024-11-12 RX ADMIN — MIDAZOLAM 2 MG: 1 INJECTION INTRAMUSCULAR; INTRAVENOUS at 09:11

## 2024-11-12 RX ADMIN — FENTANYL CITRATE 50 MCG: 50 INJECTION INTRAMUSCULAR; INTRAVENOUS at 09:11

## 2024-11-12 RX ADMIN — SODIUM CHLORIDE 250 ML/HR: 0.9 INJECTION, SOLUTION INTRAVENOUS at 09:11

## 2024-11-12 RX ADMIN — LIDOCAINE HYDROCHLORIDE 5 ML: 10 INJECTION, SOLUTION EPIDURAL; INFILTRATION; INTRACAUDAL at 09:11

## 2024-11-12 RX ADMIN — FENTANYL CITRATE 50 MCG: 50 INJECTION INTRAMUSCULAR; INTRAVENOUS at 10:11

## 2024-11-12 NOTE — PLAN OF CARE
Pt tolerated left axillary lymph node bx and right lung mass biopsy well performed by dr delgado , vs stable no co pain no acute distress noted ,back to room via stretcher with rn

## 2024-11-14 DIAGNOSIS — D3A.090 CARCINOID TUMOR DETERMINED BY BIOPSY OF LUNG: Primary | ICD-10-CM

## 2024-11-14 NOTE — PROGRESS NOTES
Dr. Everett notified the patient that the biopsy does show carcinoma in the lung.  The lymph node biopsy was nondiagnostic for cancer.  The patient should see thoracic surgery after pulmonary functions.  I anticipate her pulmonary functions are going to be nearly normal.  The patient understands that she may need resection.  She is to follow up with me as needed.  We need to make sure she gets evaluated by the surgeon after pulmonary functions

## 2024-11-19 ENCOUNTER — PATIENT MESSAGE (OUTPATIENT)
Dept: RESEARCH | Facility: HOSPITAL | Age: 69
End: 2024-11-19
Payer: MEDICARE

## 2024-11-22 ENCOUNTER — HOSPITAL ENCOUNTER (OUTPATIENT)
Dept: PULMONOLOGY | Facility: HOSPITAL | Age: 69
Discharge: HOME | End: 2024-11-22
Attending: INTERNAL MEDICINE
Payer: MEDICARE

## 2024-11-22 DIAGNOSIS — D3A.090 CARCINOID TUMOR DETERMINED BY BIOPSY OF LUNG: ICD-10-CM

## 2024-11-22 LAB
DLCO SINGLE BREATH LLN: 16.46
DLCO SINGLE BREATH PRE REF: 101 %
DLCO SINGLE BREATH REF: 22.2
DLCOC SBVA LLN: 2.94
DLCOC SBVA REF: 4.35
DLCOC SINGLE BREATH LLN: 16.46
DLCOC SINGLE BREATH REF: 22.2
DLCOVA LLN: 2.94
DLCOVA PRE REF: 155.8 %
DLCOVA PRE: 6.77 ML/(MIN*MMHG*L) (ref 2.94–5.75)
DLCOVA REF: 4.35
ERVN2 LLN: -16449.31
ERVN2 PRE REF: 53.6 %
ERVN2 PRE: 0.37 L (ref -16449.31–16450.69)
ERVN2 REF: 0.69
FEF 25 75 CHG: 8.5 %
FEF 25 75 LLN: 1.27
FEF 25 75 POST REF: 82.8 %
FEF 25 75 PRE REF: 76.3 %
FEF 25 75 REF: 2.67
FET100 CHG: -8.6 %
FEV1 CHG: -4.3 %
FEV1 FVC CHG: 1.1 %
FEV1 FVC LLN: 66
FEV1 FVC POST REF: 105.4 %
FEV1 FVC PRE REF: 104.3 %
FEV1 FVC REF: 79
FEV1 LLN: 1.4
FEV1 POST REF: 86 %
FEV1 PRE REF: 89.9 %
FEV1 REF: 1.99
FRCN2 LLN: 1.94
FRCN2 PRE REF: 317.2 %
FRCN2 REF: 2.77
FVC CHG: -5.3 %
FVC LLN: 1.82
FVC POST REF: 81 %
FVC PRE REF: 85.6 %
FVC REF: 2.56
IVC PRE: 2.21 L (ref 1.82–3.33)
IVC SINGLE BREATH LLN: 1.82
IVC SINGLE BREATH PRE REF: 86.4 %
IVC SINGLE BREATH REF: 2.56
PEF CHG: 38.1 %
PEF LLN: 3.11
PEF POST REF: 104.2 %
PEF PRE REF: 75.4 %
PEF REF: 5.18
POST FEF 25 75: 2.21 L/S (ref 1.27–4.07)
POST FET 100: 6.9 SEC
POST FEV1 FVC: 82.83 % (ref 65.81–89.56)
POST FEV1: 1.72 L (ref 1.4–2.56)
POST FVC: 2.07 L (ref 1.82–3.33)
POST PEF: 5.39 L/S (ref 3.11–7.24)
PRE DLCO: 22.42 ML/(MIN*MMHG) (ref 16.46–27.93)
PRE FEF 25 75: 2.04 L/S (ref 1.27–4.07)
PRE FET 100: 7.56 SEC
PRE FEV1 FVC: 81.91 % (ref 65.81–89.56)
PRE FEV1: 1.79 L (ref 1.4–2.56)
PRE FRC N2: 8.78 L (ref 1.94–3.59)
PRE FVC: 2.19 L (ref 1.82–3.33)
PRE PEF: 3.9 L/S (ref 3.11–7.24)
RVN2 LLN: 1.5
RVN2 PRE REF: 404.8 %
RVN2 PRE: 8.41 L (ref 1.5–2.65)
RVN2 REF: 2.08
RVN2TLCN2 LLN: 32.49
RVN2TLCN2 PRE REF: 185.3 %
RVN2TLCN2 PRE: 77.98 % (ref 32.49–51.67)
RVN2TLCN2 REF: 42.08
TLCN2 LLN: 4.12
TLCN2 PRE REF: 211.1 %
TLCN2 PRE: 10.78 L (ref 4.12–6.09)
TLCN2 REF: 5.11
VA PRE: 3.31 L (ref 4.96–4.96)
VA SINGLE BREATH LLN: 4.96
VA SINGLE BREATH PRE REF: 66.8 %
VA SINGLE BREATH REF: 4.96
VCMAXN2 LLN: 1.82
VCMAXN2 PRE REF: 92.9 %
VCMAXN2 PRE: 2.37 L (ref 1.82–3.33)
VCMAXN2 REF: 2.56

## 2024-11-22 PROCEDURE — 94060 EVALUATION OF WHEEZING: CPT

## 2024-11-22 PROCEDURE — 94727 GAS DIL/WSHOT DETER LNG VOL: CPT

## 2024-11-22 PROCEDURE — 94729 DIFFUSING CAPACITY: CPT

## 2024-11-25 PROCEDURE — 94729 DIFFUSING CAPACITY: CPT | Mod: 26,,, | Performed by: INTERNAL MEDICINE

## 2024-11-25 PROCEDURE — 94727 GAS DIL/WSHOT DETER LNG VOL: CPT | Mod: 26,,, | Performed by: INTERNAL MEDICINE

## 2024-11-25 PROCEDURE — 94060 EVALUATION OF WHEEZING: CPT | Mod: 26,,, | Performed by: INTERNAL MEDICINE

## 2024-11-25 NOTE — PROGRESS NOTES
Notify pulmonary functions were very good, would recommend thoracic surgery follow-up as soon as able.

## 2024-11-29 ENCOUNTER — TELEPHONE (OUTPATIENT)
Dept: PULMONOLOGY | Facility: CLINIC | Age: 69
End: 2024-11-29
Payer: MEDICARE

## 2024-11-29 NOTE — TELEPHONE ENCOUNTER
Adelaida Solo, RN  Hannah Huddleston,    This NP referral is being addressed by Dr. Thompson in the [facility] 12/6/2024. Dr. Thompson and Dr. Gray are Dr. Zimmerman's partners in Thoracic Surgery providing care for patients as well.    Thanks,  STU Diaz, MSN, RN, [facility]  RN Navigator  Thoracic Surgery

## 2024-12-05 NOTE — PROGRESS NOTES
Thoracic Surgery History & Physical    Subjective     History of Present Illness:  Patient is a 69 y.o. 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  PMHistory of obstructive sleep apnea, obesity, hypertension, (coreg, losartan). S/p Thyroidectomy (~2009) on synthroid. CT chest 9/9/24 with RML noncalcified nodule measuring 1.9 cm. PET with SUV max 4.1 and avid left axillary lymphadenectomy. Percutaneous biopsy returned carcinoid.  Axillary lymph node negative for malignancy.     Patient denies cough, denies hemoptysis. Denies weight loss.     PSH: spinal fusion, appendectomy, carpal tunnel release\  Never smoker    No chief complaint on file.      Review of patient's allergies indicates:  No Known Allergies    Current Outpatient Medications   Medication Sig Dispense Refill    albuterol (PROVENTIL/VENTOLIN HFA) 90 mcg/actuation inhaler       ALPRAZolam (XANAX) 0.25 MG tablet Take 1 tablet (0.25 mg total) by mouth 3 (three) times daily as needed for Anxiety. 15 tablet 0    atorvastatin (LIPITOR) 20 MG tablet TAKE 1 TABLET BY MOUTH DAILY IN THE EVENING AFTER SUPPER      carvediloL (COREG) 3.125 MG tablet TAKE 1 TABLET BY MOUTH EVERY MORNING AND 2 TABLETS EVERY EVENING      cetirizine (ZYRTEC) 10 MG tablet Take 1 tablet by mouth once daily.      cholecalciferol, vitamin D3, 1,250 mcg (50,000 unit) capsule Take 1 capsule by mouth every 7 days.      clopidogreL (PLAVIX) 75 mg tablet Take 75 mg by mouth.      ferrous sulfate (FEOSOL) 325 mg (65 mg iron) Tab tablet Take 325 mg by mouth daily with breakfast.      fluticasone propionate (FLONASE) 50 mcg/actuation nasal spray       furosemide (LASIX) 20 MG tablet Take by mouth. (Patient not taking: Reported on 9/25/2024)      levothyroxine (SYNTHROID, LEVOTHROID) 175 MCG tablet Take 1 tablet by mouth once daily.      losartan (COZAAR) 50 MG tablet Take 1 tablet by mouth once daily.      metFORMIN (GLUCOPHAGE) 500 MG tablet TAKE 1 TABLET BY  MOUTH DAILY TAKE WITH FOOOD       No current facility-administered medications for this visit.       No past medical history on file.  Past Surgical History:   Procedure Laterality Date    APPENDECTOMY  2005    CARPAL TUNNEL RELEASE  2003    SPINAL FUSION  2015     No family history on file.  Social History     Tobacco Use    Smoking status: Never    Smokeless tobacco: Never   Substance Use Topics    Alcohol use: Not Currently    Drug use: Never        Review of Systems:  Review of Systems   Constitutional:  Negative for activity change, appetite change and unexpected weight change.   HENT:  Negative for congestion.    Respiratory:  Negative for cough and shortness of breath.    Hematological: Negative.    Psychiatric/Behavioral: Negative.            Objective     Vital Signs (Most Recent)    Physical Exam:  Physical Exam  Constitutional:       Appearance: Normal appearance. She is obese.   HENT:      Head: Normocephalic and atraumatic.   Cardiovascular:      Rate and Rhythm: Normal rate and regular rhythm.   Pulmonary:      Effort: Pulmonary effort is normal.      Breath sounds: Normal breath sounds.   Skin:     General: Skin is warm and dry.   Neurological:      General: No focal deficit present.      Mental Status: She is alert and oriented to person, place, and time.   Psychiatric:         Mood and Affect: Mood normal.         Behavior: Behavior normal.     Diagnostic Results:  CT chest 9/9/24:  There is a 1.9 cm noncalcified pulmonary nodule seen within the right middle lobe of the lung. There is an adjacent 7 mm ground-glass density possibly representing atelectasis. There is a 4 mm noncalcified pulmonary nodule the osseous structures show degenerative change. Visualized portions of the superior abdomen are significant for cholelithiasis.     PET 10/21/24:  1.  FDG avid pulmonary nodule in the right middle lobe along the fissure compatible with malignancy.  2.  Contralateral FDG avid mildly enlarged left  supraclavicular and axillary lymphadenopathy, suspicious for malignancy.    PFTs 11/22/24:  FEV1 - 1.79    89.9%  DLCO - 22.42   101.0%       Assessment and Plan   Patient is a 69 y.o. 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 CT chest 9/9/24 with RML noncalcified nodule measuring 1.9 cm. Mild PET avidity. Patient with good PFTs. We discussed the survival benefit of surgery for early stage carcinoid.     The patient was offered a robotic right middle lobectomy. I discussed with the patient the risks, benefits and alternatives to surgery. Specifically I informed her of the risk of bleeding, infection, injury to nearby structures within the chest and conversion to an open procedure. In addition,  I discussed with her that although there does not appear to be evidence of regional or distant disease there may evidence of spread at the time of surgery or when the specimen is examined by pathologist. Following this conversation the patient agreed to surgery.      PLAN:  - Will need cardiac stress test  - Routine labs + Thyroid function labs  - Patient w/ hx of SALIMA, may need CPAP during hospitalization  - Plan for Robotic assisted RML wedge vs lobectomy. Possible OR date of 01/07/25 discussed    Ke Thompson M.D.  [phone] (direct)  Thoracic Surgery   Rene Martinez

## 2024-12-06 ENCOUNTER — TELEPHONE (OUTPATIENT)
Facility: CLINIC | Age: 69
End: 2024-12-06

## 2024-12-06 ENCOUNTER — OFFICE VISIT (OUTPATIENT)
Facility: CLINIC | Age: 69
End: 2024-12-06
Payer: MEDICARE

## 2024-12-06 VITALS
DIASTOLIC BLOOD PRESSURE: 73 MMHG | BODY MASS INDEX: 47.61 KG/M2 | WEIGHT: 286.13 LBS | SYSTOLIC BLOOD PRESSURE: 157 MMHG | RESPIRATION RATE: 18 BRPM | TEMPERATURE: 98 F | HEART RATE: 79 BPM

## 2024-12-06 DIAGNOSIS — D3A.090 CARCINOID TUMOR DETERMINED BY BIOPSY OF LUNG: Primary | ICD-10-CM

## 2024-12-06 DIAGNOSIS — R06.09 OTHER FORMS OF DYSPNEA: ICD-10-CM

## 2024-12-06 DIAGNOSIS — C34.90 MALIGNANT NEOPLASM OF LUNG, UNSPECIFIED LATERALITY, UNSPECIFIED PART OF LUNG: Primary | ICD-10-CM

## 2024-12-06 DIAGNOSIS — E78.5 HYPERLIPIDEMIA, UNSPECIFIED HYPERLIPIDEMIA TYPE: ICD-10-CM

## 2024-12-06 DIAGNOSIS — I10 HYPERTENSION, UNSPECIFIED TYPE: ICD-10-CM

## 2024-12-06 DIAGNOSIS — E11.9 TYPE 2 DIABETES MELLITUS WITHOUT COMPLICATION, UNSPECIFIED WHETHER LONG TERM INSULIN USE: ICD-10-CM

## 2024-12-06 DIAGNOSIS — D3A.090 CARCINOID TUMOR OF LUNG, UNSPECIFIED WHETHER MALIGNANT: Primary | ICD-10-CM

## 2024-12-06 PROCEDURE — 99999 PR PBB SHADOW E&M-EST. PATIENT-LVL IV: CPT | Mod: PBBFAC,,,

## 2024-12-13 ENCOUNTER — TELEPHONE (OUTPATIENT)
Facility: CLINIC | Age: 69
End: 2024-12-13
Payer: MEDICARE

## 2024-12-13 NOTE — NURSING
Oncology Navigation   Intake      Treatment                              Acuity      Follow Up  No follow-ups on file.     Spoke with patient on 12/9/24 and she stated she became ill over the weekend and had to cancel her stress test on 12/11/24. Spoke with patient today about how she was feeling and she stated she is better but not fully recovered or back to her baseline health. Discussed that her stress test was rescheduled for 12/26/24. Will continue to follow patient next week and check in with her.

## 2024-12-24 ENCOUNTER — TELEPHONE (OUTPATIENT)
Dept: CARDIOLOGY | Facility: HOSPITAL | Age: 69
End: 2024-12-24

## 2024-12-26 ENCOUNTER — HOSPITAL ENCOUNTER (OUTPATIENT)
Dept: CARDIOLOGY | Facility: HOSPITAL | Age: 69
Discharge: HOME | End: 2024-12-26
Attending: STUDENT IN AN ORGANIZED HEALTH CARE EDUCATION/TRAINING PROGRAM
Payer: MEDICARE

## 2024-12-26 ENCOUNTER — HOSPITAL ENCOUNTER (OUTPATIENT)
Dept: RADIOLOGY | Facility: HOSPITAL | Age: 69
Discharge: HOME | End: 2024-12-26
Attending: STUDENT IN AN ORGANIZED HEALTH CARE EDUCATION/TRAINING PROGRAM
Payer: MEDICARE

## 2024-12-26 DIAGNOSIS — E11.9 TYPE 2 DIABETES MELLITUS WITHOUT COMPLICATION, UNSPECIFIED WHETHER LONG TERM INSULIN USE: ICD-10-CM

## 2024-12-26 DIAGNOSIS — E78.5 HYPERLIPIDEMIA, UNSPECIFIED HYPERLIPIDEMIA TYPE: ICD-10-CM

## 2024-12-26 DIAGNOSIS — I10 HYPERTENSION, UNSPECIFIED TYPE: ICD-10-CM

## 2024-12-26 DIAGNOSIS — R06.09 OTHER FORMS OF DYSPNEA: ICD-10-CM

## 2024-12-26 DIAGNOSIS — C34.90 MALIGNANT NEOPLASM OF LUNG, UNSPECIFIED LATERALITY, UNSPECIFIED PART OF LUNG: ICD-10-CM

## 2024-12-26 PROCEDURE — 63600175 PHARM REV CODE 636 W HCPCS: Performed by: STUDENT IN AN ORGANIZED HEALTH CARE EDUCATION/TRAINING PROGRAM

## 2024-12-26 PROCEDURE — A9502 TC99M TETROFOSMIN: HCPCS | Performed by: STUDENT IN AN ORGANIZED HEALTH CARE EDUCATION/TRAINING PROGRAM

## 2024-12-26 PROCEDURE — 93017 CV STRESS TEST TRACING ONLY: CPT

## 2024-12-26 RX ORDER — REGADENOSON 0.08 MG/ML
0.4 INJECTION, SOLUTION INTRAVENOUS ONCE
Status: COMPLETED | OUTPATIENT
Start: 2024-12-26 | End: 2024-12-26

## 2024-12-26 RX ADMIN — TETROFOSMIN 27.5 MILLICURIE: 1.38 INJECTION, POWDER, LYOPHILIZED, FOR SOLUTION INTRAVENOUS at 08:12

## 2024-12-26 RX ADMIN — REGADENOSON 0.4 MG: 0.08 INJECTION, SOLUTION INTRAVENOUS at 08:12

## 2024-12-27 ENCOUNTER — HOSPITAL ENCOUNTER (OUTPATIENT)
Dept: RADIOLOGY | Facility: HOSPITAL | Age: 69
Discharge: HOME | End: 2024-12-27
Attending: STUDENT IN AN ORGANIZED HEALTH CARE EDUCATION/TRAINING PROGRAM
Payer: MEDICARE

## 2024-12-27 LAB
CV PHARM DOSE: 0.4 MG
CV STRESS BASE HR: 72 BPM
DIASTOLIC BLOOD PRESSURE: 76 MMHG
EJECTION FRACTION- HIGH: 65 %
END DIASTOLIC INDEX-HIGH: 153 ML/M2
END DIASTOLIC INDEX-LOW: 93 ML/M2
END SYSTOLIC INDEX-HIGH: 71 ML/M2
END SYSTOLIC INDEX-LOW: 31 ML/M2
NUC REST DIASTOLIC VOLUME INDEX: 97
NUC REST EJECTION FRACTION: 75
NUC REST SYSTOLIC VOLUME INDEX: 24
NUC STRESS DIASTOLIC VOLUME INDEX: 95
NUC STRESS EJECTION FRACTION: 68 %
NUC STRESS SYSTOLIC VOLUME INDEX: 30
OHS CV CPX 1 MINUTE RECOVERY HEART RATE: 107 BPM
OHS CV CPX 85 PERCENT MAX PREDICTED HEART RATE MALE: 128
OHS CV CPX MAX PREDICTED HEART RATE: 151
OHS CV CPX PATIENT IS FEMALE: 1
OHS CV CPX PATIENT IS MALE: 0
OHS CV CPX PEAK DIASTOLIC BLOOD PRESSURE: 43 MMHG
OHS CV CPX PEAK HEAR RATE: 110 BPM
OHS CV CPX PEAK RATE PRESSURE PRODUCT: NORMAL
OHS CV CPX PEAK SYSTOLIC BLOOD PRESSURE: 101 MMHG
OHS CV CPX PERCENT MAX PREDICTED HEART RATE ACHIEVED: 76
OHS CV CPX RATE PRESSURE PRODUCT PRESENTING: NORMAL
OHS CV INITIAL DOSE: 25.6 MCG/KG/MIN
OHS CV PEAK DOSE: 27.5 MCG/KG/MIN
RETIRED EF AND QEF - SEE NOTES: 53 %
SYSTOLIC BLOOD PRESSURE: 144 MMHG

## 2024-12-27 PROCEDURE — A9502 TC99M TETROFOSMIN: HCPCS | Performed by: STUDENT IN AN ORGANIZED HEALTH CARE EDUCATION/TRAINING PROGRAM

## 2024-12-27 RX ADMIN — TETROFOSMIN 25.6 MILLICURIE: 1.38 INJECTION, POWDER, LYOPHILIZED, FOR SOLUTION INTRAVENOUS at 07:12

## 2024-12-30 ENCOUNTER — TELEPHONE (OUTPATIENT)
Facility: CLINIC | Age: 69
End: 2024-12-30
Payer: MEDICARE

## 2024-12-30 NOTE — NURSING
Oncology Navigation   Intake      Treatment                              Acuity      Follow Up  No follow-ups on file.     Attempted to reach patient by phone but got no answer. Spoke with patient's niece (Ms John Culp- who drives patient to her appts and very involved in her care) who states she and patient are aware of her upcoming surgery with Dr Thompson on 1/7/25.

## 2024-12-31 DIAGNOSIS — F41.9 ANXIETY: ICD-10-CM

## 2024-12-31 RX ORDER — ALPRAZOLAM 0.25 MG/1
0.25 TABLET ORAL 3 TIMES DAILY PRN
Qty: 15 TABLET | Refills: 0 | Status: SHIPPED | OUTPATIENT
Start: 2024-12-31 | End: 2025-04-14

## 2025-01-01 NOTE — PROCEDURES
TITLE OF OPERATION:  Complex uroflowmetry  Complex cystometry  Electromyography with surface electrodes  Pressure voiding flow study  Urethral pressure profile       INDICATIONS:  68 Y O F  has no past medical history on file.    History of Present Illness  HPI 68 y.o.  female No obstetric history on file.   has no past medical history on file.    Refereed by Dr. Patel for evaluation of prolapse which has been an issue for the past three or 4 years.      1)  UI:  (+) ONI  (+) UUI  -  (+) pads:.  Daytime frequency: Q 4 -5 hours.  Nocturia: yes 3-4 trines :    (-) dysuria-  (--) hematuria,  (--) frequent UTIs.  (+) incomplete bladder emptying.      2)  POP:  .   Symptoms:(+)  .  (--) vaginal bleeding. (--) vaginal discharge. (--) sexually active.  (+) dyspareunia when she was sexually active.  (--)  Vaginal dryness.  (--) vaginal estrogen use.      3)  BM:  (+) constipation/straining.  (--) chronic diarrhea. (--) hematochezia.  (--) fecal incontinence.  (--) fecal smearing/urgency.  (--) incomplete evacuation.       PREOPERATIVE DIAGNOSIS:  Mixed urinary incontinence   Pelvic organ prolapse   Urinary  urgency      POSTOPERATIVE DIAGNOSIS:    Mixed urinary incontinence   Pelvic organ prolapse   Urinary urgency     ANESTHESIA:  None.    SPECIMEN (BACTERIOLOGICAL, PATHOLOGICAL OR OTHER):  None.    PROSTHETIC DEVICE/IMPLANT:  None.    SURGEONS NARRATIVE:  A time out was performed in which the patient identity and procedure were confirmed.  Urodynamic evaluation was performed using a computerized system ([employer]  Uroflowmetry was performed on the patient in the sitting position without catheters in place.  Subsequent urodynamic testing was performed with the patient in the lithotomy position at 45 degrees. Air charged catheters were used with sterile water as the infusion medium. Vesical and abdominal (rectal) pressures were measured, and detrusor pressure was calculated. EMG activity was recorded with  surface electrodes. During filling, room temperature sterile water was infused at a rate of 30 cubic centimeters per minute. The patient was asked cough after instillation of each 150 mL volume. Two Valsalva leak point pressures and two cough leak point pressures were performed with the catheters in place at 150, 300 cubic centimeters and again at maximum capacity. Valsalva leak point pressure was defined as the difference between vesical pressure at which leakage was noted (visualized at the external urethral meatus) and the baseline vesical pressure. Following urodynamic testing, a pressure flow study was performed with the patient in the sitting position. Vesical and abdominal pressures were monitored and detrusor pressures were calculated. After the pressure flow study, the catheters were then removed. The patient tolerated the procedure well.     Urine dipstick: normal     Complex Urinary Flow Study    For uroflow, the patient voided 322 mL with a maximum flow of  27 mL/Sec and an average flow of 11 and with a post void residual of 50 mL.  The overall configuration of this uroflow study was : normal bell shaped.       Cystometry:   Using calibrated electronic equipment, cystometry was done with a medium fill rate of 30 mL per minute and simultaneous measurement of intraabdominal pressure using a rectal catheter and bladder pressure using a urethral catheter. The first sensation occurred at 120 mL at a detrusor pressure of 7 cm H20 and first desire at 160 mL at a detrusor pressure of 8 cm H20 a strong desire to void occurred at 264 mL with a detrusor pressure of 1  cm H20. The patient was filled to a maximum capacity of 391 mL with a detrusor pressure of 0 cm H20.   Detrusor contractions was not observed.     Voiding detrusor pressure:   The patient was instructed to void and the maximum detrusor pressure was measured. The patient voided  279., and an additional 100 mL's in the hat.  Study was completed and  performed utilizing both bladder and rectal catheters for detrusor, vesical, and abdominal pressure measurement.  The pressure flow, according to the differential between the abdominal and bladder pressure, showed a maximum detrusor pressure of 83  cm of water.  The average  flow rate was  5  mL per second.   She voided with an intermittent pressure. The remaining volume of 139 mL  was measured presenting the post void residual.     Urethral Pressure Profile:      Detrusor instability ( UI)   was not noted  under the following condition of the test.      A stress test was performed at the following bladder bladder volumes:    150 mL with a peak pressure of  162 cm H20 and the patient did not leak   150 mL with a peak pressure of  123 cm H20 and the patient did not leak         302 mL with a peak pressure of  100 cm H20 and the patient did not leak  302 mL with a peak pressure of  151 cm H20 and the patient did not leak     332 mL with a calculated LLP of 125 and a Pves pressure of 96 cm H2O  332mL with a calculated LLP of 18 and a Pves pressure of 60 cm H2O      Electromyography: Provocative maneuvers produced appropriate changes in waveforms. The EMG shows increased EMG activity with increased intraabdominal pressure. There was a decrease in the EMG activity during voiding  consistent with normal function of the pelvic floor.    These findings are consistent with Positive urodynamic stress incontinence .    Assessment: Compliance  ( decreased )   Max capacity .  DO (--).  ONI (+).      Plan:  Reviewed treatment options including but not limited to : autologous fascial sling, mid urethral sling: Trans obturator/ retropubic/single incision and periurethral bulking. We will proceed with periurethral bulking. I have reviewed given the minimal leakage noted it is best to proceed with the BulkaMid at the time of her [facility] SSF.

## 2025-01-06 ENCOUNTER — TELEPHONE (OUTPATIENT)
Dept: CARDIOTHORACIC SURGERY | Facility: CLINIC | Age: 70
End: 2025-01-06
Payer: MEDICARE

## 2025-01-06 ENCOUNTER — PATIENT MESSAGE (OUTPATIENT)
Dept: CARDIOTHORACIC SURGERY | Facility: CLINIC | Age: 70
End: 2025-01-06
Payer: MEDICARE

## 2025-01-06 ENCOUNTER — ANESTHESIA EVENT (OUTPATIENT)
Dept: SURGERY | Facility: HOSPITAL | Age: 70
End: 2025-01-06
Payer: MEDICARE

## 2025-01-06 RX ORDER — ACETAMINOPHEN 500 MG
1000 TABLET ORAL
OUTPATIENT
Start: 2025-01-07 | End: 2025-01-07

## 2025-01-06 NOTE — ANESTHESIA PREPROCEDURE EVALUATION
[facility]  Anesthesia Pre-Operative Evaluation         Patient Name: Felicity Culp  YOB: 1955  MRN: [id number]    SUBJECTIVE:     Pre-operative evaluation for Procedure(s) (LRB):  XI ROBOTIC RATS,WITH LOBECTOMY,LUNG - vs RML wedge resection (Right)  Scheduled for 1/7/2025    HPI 01/06/2025:  Felicity Culp is a 69 y.o. female with hx of obesity, SALIMA, and HTN. 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 She is planned for a robot assisted RML wedge resection vs lobectomy.     Stress test 12/27/24:    Equivocal myocardial perfusion scan. On attenuation corrected polar maps, there are 2 fixed defects which total encompasses around 5% of the entire LV myocardium.  These defects are located in basal anteroseptal area and mid anterolateral segment. Normal wall motion, likely artifact.  Can consider PET stress versus angiogram for further evaluation if clinical concerns persist.    The gated perfusion images showed an ejection fraction of 75% at rest. The gated perfusion images showed an ejection fraction of 68% post stress. Normal ejection fraction is greater than 53%.    The ECG portion of the study is negative for ischemia.    There were no arrhythmias during stress.           Patient Active Problem List   Diagnosis    Uterovaginal prolapse, complete    Solitary pulmonary nodule       Review of patient's allergies indicates:  No Known Allergies    Outpatient Medications:  No current facility-administered medications on file prior to encounter.     Current Outpatient Medications on File Prior to Encounter   Medication Sig Dispense Refill    albuterol (PROVENTIL/VENTOLIN HFA) 90 mcg/actuation inhaler       atorvastatin (LIPITOR) 20 MG tablet TAKE 1 TABLET BY MOUTH DAILY IN THE EVENING AFTER SUPPER      carvediloL (COREG) 3.125 MG tablet TAKE 1 TABLET BY MOUTH EVERY MORNING AND 2 TABLETS EVERY EVENING      cetirizine (ZYRTEC) 10 MG tablet Take 1 tablet by mouth once  daily.      cholecalciferol, vitamin D3, 1,250 mcg (50,000 unit) capsule Take 1 capsule by mouth every 7 days.      clopidogreL (PLAVIX) 75 mg tablet Take 75 mg by mouth.      ferrous sulfate (FEOSOL) 325 mg (65 mg iron) Tab tablet Take 325 mg by mouth daily with breakfast.      fluticasone propionate (FLONASE) 50 mcg/actuation nasal spray       furosemide (LASIX) 20 MG tablet Take by mouth. (Patient not taking: Reported on 9/25/2024)      levothyroxine (SYNTHROID, LEVOTHROID) 175 MCG tablet Take 1 tablet by mouth once daily.      losartan (COZAAR) 50 MG tablet Take 1 tablet by mouth once daily.      metFORMIN (GLUCOPHAGE) 500 MG tablet TAKE 1 TABLET BY MOUTH DAILY TAKE WITH FOOOD          Current Inpatient Medications:      Past Surgical History:   Procedure Laterality Date    APPENDECTOMY  2005    CARPAL TUNNEL RELEASE  2003    SPINAL FUSION  2015       Social History     Socioeconomic History    Marital status: Single   Tobacco Use    Smoking status: Never    Smokeless tobacco: Never   Substance and Sexual Activity    Alcohol use: Not Currently    Drug use: Never       OBJECTIVE:     Weight:  Wt Readings from Last 1 Encounters:   12/06/24 129.8 kg (286 lb 1.6 oz)     There is no height or weight on file to calculate BMI.    Recent Blood Pressure Readings:  BP Readings from Last 3 Encounters:   12/06/24 (!) 157/73   11/12/24 (!) 127/50   09/25/24 (!) 145/70       Vital Signs Range (Last 24H):         CBC:   Lab Results   Component Value Date    WBC 4.68 12/27/2024    HGB 11.4 (L) 12/27/2024    HCT 36.7 (L) 12/27/2024    MCV 89 12/27/2024     12/27/2024       CMP:     Chemistry        Component Value Date/Time     12/27/2024 0842    K 4.0 12/27/2024 0842     12/27/2024 0842    CO2 30 (H) 12/27/2024 0842    BUN 12 12/27/2024 0842    CREATININE 0.7 12/27/2024 0842     12/27/2024 0842        Component Value Date/Time    CALCIUM 9.4 12/27/2024 0842    ALKPHOS 80 12/27/2024 0842    AST 18  12/27/2024 0842    ALT 22 12/27/2024 0842    BILITOT 0.6 12/27/2024 0842            INR:  Lab Results   Component Value Date    INR 1.0 11/12/2024       Diagnostic Studies:      EKG:     No results found for this or any previous visit.    2D Echo:    No results found for this or any previous visit.    No results found for this or any previous visit.    No results found for this or any previous visit.      ASSESSMENT/PLAN:           Pre-op Assessment    I have reviewed the Patient Summary Reports.    I have reviewed the NPO Status.      Review of Systems  Anesthesia Hx:  No problems with previous Anesthesia   History of prior surgery of interest to airway management or planning:          Denies Family Hx of Anesthesia complications.    Denies Personal Hx of Anesthesia complications.                    Social:  Non-Smoker       Hematology/Oncology:                                  Oncology Comments: Pulmonary nodule, +carcinoid  Lymph node biopsy negative     Cardiovascular:     Denies Pacemaker.  Denies Hypertension.    Denies CABG/stent.     Denies CHF.                                   Pulmonary:    Denies COPD.  Denies Asthma.    Sleep Apnea                Hepatic/GI:      Denies GERD.                Neurological:    Denies CVA.    Denies Seizures.                                Endocrine:  Denies Diabetes.         Obesity / BMI > 30         Anesthesia Plan  Type of Anesthesia, risks & benefits discussed:    Anesthesia Type: Gen ETT  Intra-op Monitoring Plan: Standard ASA Monitors and Art Line  Post Op Pain Control Plan: multimodal analgesia and IV/PO Opioids PRN  Induction:  IV  Airway Plan: Video, Post-Induction  Informed Consent: Informed consent signed with the Patient and all parties understand the risks and agree with anesthesia plan.  All questions answered.   ASA Score: 3  Day of Surgery Review of History & Physical: H&P Update referred to the surgeon/provider.    Ready For Surgery From Anesthesia  Perspective.     .

## 2025-01-06 NOTE — TELEPHONE ENCOUNTER
Spoke to pt. Confirmed 5a arrival time per MARILYNN patiño. Also sending instructions via [employer].

## 2025-01-07 ENCOUNTER — ANESTHESIA (OUTPATIENT)
Dept: SURGERY | Facility: HOSPITAL | Age: 70
End: 2025-01-07
Payer: MEDICARE

## 2025-01-07 ENCOUNTER — HOSPITAL ENCOUNTER (INPATIENT)
Facility: HOSPITAL | Age: 70
LOS: 2 days | Discharge: HOME | DRG: 164 | End: 2025-01-09
Attending: STUDENT IN AN ORGANIZED HEALTH CARE EDUCATION/TRAINING PROGRAM | Admitting: STUDENT IN AN ORGANIZED HEALTH CARE EDUCATION/TRAINING PROGRAM
Payer: MEDICARE

## 2025-01-07 DIAGNOSIS — C34.91 MALIGNANT NEOPLASM OF RIGHT LUNG: ICD-10-CM

## 2025-01-07 DIAGNOSIS — R91.1 SOLITARY PULMONARY NODULE: Primary | ICD-10-CM

## 2025-01-07 PROBLEM — D3A.090 CARCINOID TUMOR OF RIGHT LUNG: Status: ACTIVE | Noted: 2025-01-07

## 2025-01-07 LAB
ABO + RH BLD: NORMAL
BLD GP AB SCN CELLS X3 SERPL QL: NORMAL
POCT GLUCOSE: 148 MG/DL (ref 70–110)
SPECIMEN OUTDATE: NORMAL

## 2025-01-07 PROCEDURE — 71000033 HC RECOVERY, INTIAL HOUR: Performed by: STUDENT IN AN ORGANIZED HEALTH CARE EDUCATION/TRAINING PROGRAM

## 2025-01-07 PROCEDURE — 63600175 PHARM REV CODE 636 W HCPCS: Performed by: STUDENT IN AN ORGANIZED HEALTH CARE EDUCATION/TRAINING PROGRAM

## 2025-01-07 PROCEDURE — 25000003 PHARM REV CODE 250: Performed by: STUDENT IN AN ORGANIZED HEALTH CARE EDUCATION/TRAINING PROGRAM

## 2025-01-07 PROCEDURE — 36000713 HC OR TIME LEV V EA ADD 15 MIN: Performed by: STUDENT IN AN ORGANIZED HEALTH CARE EDUCATION/TRAINING PROGRAM

## 2025-01-07 PROCEDURE — 32666 THORACOSCOPY W/WEDGE RESECT: CPT | Mod: RT,,, | Performed by: STUDENT IN AN ORGANIZED HEALTH CARE EDUCATION/TRAINING PROGRAM

## 2025-01-07 PROCEDURE — 8E0W4CZ ROBOTIC ASSISTED PROCEDURE OF TRUNK REGION, PERCUTANEOUS ENDOSCOPIC APPROACH: ICD-10-PCS | Performed by: STUDENT IN AN ORGANIZED HEALTH CARE EDUCATION/TRAINING PROGRAM

## 2025-01-07 PROCEDURE — 99900035 HC TECH TIME PER 15 MIN (STAT)

## 2025-01-07 PROCEDURE — 88342 IMHCHEM/IMCYTCHM 1ST ANTB: CPT | Performed by: PATHOLOGY

## 2025-01-07 PROCEDURE — 36000712 HC OR TIME LEV V 1ST 15 MIN: Performed by: STUDENT IN AN ORGANIZED HEALTH CARE EDUCATION/TRAINING PROGRAM

## 2025-01-07 PROCEDURE — 32667 THORACOSCOPY W/W RESECT ADDL: CPT | Mod: RT,,, | Performed by: STUDENT IN AN ORGANIZED HEALTH CARE EDUCATION/TRAINING PROGRAM

## 2025-01-07 PROCEDURE — C1729 CATH, DRAINAGE: HCPCS | Performed by: STUDENT IN AN ORGANIZED HEALTH CARE EDUCATION/TRAINING PROGRAM

## 2025-01-07 PROCEDURE — 20600001 HC STEP DOWN PRIVATE ROOM

## 2025-01-07 PROCEDURE — 94761 N-INVAS EAR/PLS OXIMETRY MLT: CPT

## 2025-01-07 PROCEDURE — 94640 AIRWAY INHALATION TREATMENT: CPT

## 2025-01-07 PROCEDURE — 88307 TISSUE EXAM BY PATHOLOGIST: CPT | Mod: 26,,, | Performed by: PATHOLOGY

## 2025-01-07 PROCEDURE — 25000003 PHARM REV CODE 250

## 2025-01-07 PROCEDURE — 32674 THORACOSCOPY LYMPH NODE EXC: CPT | Mod: ,,, | Performed by: STUDENT IN AN ORGANIZED HEALTH CARE EDUCATION/TRAINING PROGRAM

## 2025-01-07 PROCEDURE — 0BBD4ZZ EXCISION OF RIGHT MIDDLE LUNG LOBE, PERCUTANEOUS ENDOSCOPIC APPROACH: ICD-10-PCS | Performed by: STUDENT IN AN ORGANIZED HEALTH CARE EDUCATION/TRAINING PROGRAM

## 2025-01-07 PROCEDURE — 27201423 OPTIME MED/SURG SUP & DEVICES STERILE SUPPLY: Performed by: STUDENT IN AN ORGANIZED HEALTH CARE EDUCATION/TRAINING PROGRAM

## 2025-01-07 PROCEDURE — 63600175 PHARM REV CODE 636 W HCPCS: Mod: TB | Performed by: STUDENT IN AN ORGANIZED HEALTH CARE EDUCATION/TRAINING PROGRAM

## 2025-01-07 PROCEDURE — 71000016 HC POSTOP RECOV ADDL HR: Performed by: STUDENT IN AN ORGANIZED HEALTH CARE EDUCATION/TRAINING PROGRAM

## 2025-01-07 PROCEDURE — 37000009 HC ANESTHESIA EA ADD 15 MINS: Performed by: STUDENT IN AN ORGANIZED HEALTH CARE EDUCATION/TRAINING PROGRAM

## 2025-01-07 PROCEDURE — 88307 TISSUE EXAM BY PATHOLOGIST: CPT | Mod: 59 | Performed by: PATHOLOGY

## 2025-01-07 PROCEDURE — 37000008 HC ANESTHESIA 1ST 15 MINUTES: Performed by: STUDENT IN AN ORGANIZED HEALTH CARE EDUCATION/TRAINING PROGRAM

## 2025-01-07 PROCEDURE — 32667 THORACOSCOPY W/W RESECT ADDL: CPT | Mod: AS,RT,,

## 2025-01-07 PROCEDURE — 94799 UNLISTED PULMONARY SVC/PX: CPT

## 2025-01-07 PROCEDURE — 0BJ08ZZ INSPECTION OF TRACHEOBRONCHIAL TREE, VIA NATURAL OR ARTIFICIAL OPENING ENDOSCOPIC: ICD-10-PCS | Performed by: STUDENT IN AN ORGANIZED HEALTH CARE EDUCATION/TRAINING PROGRAM

## 2025-01-07 PROCEDURE — 0BBC4ZZ EXCISION OF RIGHT UPPER LUNG LOBE, PERCUTANEOUS ENDOSCOPIC APPROACH: ICD-10-PCS | Performed by: STUDENT IN AN ORGANIZED HEALTH CARE EDUCATION/TRAINING PROGRAM

## 2025-01-07 PROCEDURE — 71000015 HC POSTOP RECOV 1ST HR: Performed by: STUDENT IN AN ORGANIZED HEALTH CARE EDUCATION/TRAINING PROGRAM

## 2025-01-07 PROCEDURE — 07T74ZZ RESECTION OF THORAX LYMPHATIC, PERCUTANEOUS ENDOSCOPIC APPROACH: ICD-10-PCS | Performed by: STUDENT IN AN ORGANIZED HEALTH CARE EDUCATION/TRAINING PROGRAM

## 2025-01-07 PROCEDURE — 88341 IMHCHEM/IMCYTCHM EA ADD ANTB: CPT | Mod: 59 | Performed by: PATHOLOGY

## 2025-01-07 PROCEDURE — 32666 THORACOSCOPY W/WEDGE RESECT: CPT | Mod: AS,RT,,

## 2025-01-07 PROCEDURE — 36620 INSERTION CATHETER ARTERY: CPT | Mod: 59,,, | Performed by: ANESTHESIOLOGY

## 2025-01-07 PROCEDURE — 25000242 PHARM REV CODE 250 ALT 637 W/ HCPCS

## 2025-01-07 PROCEDURE — 88341 IMHCHEM/IMCYTCHM EA ADD ANTB: CPT | Mod: 26,,, | Performed by: PATHOLOGY

## 2025-01-07 PROCEDURE — 63600175 PHARM REV CODE 636 W HCPCS

## 2025-01-07 PROCEDURE — 86850 RBC ANTIBODY SCREEN: CPT

## 2025-01-07 PROCEDURE — 36415 COLL VENOUS BLD VENIPUNCTURE: CPT

## 2025-01-07 PROCEDURE — 32674 THORACOSCOPY LYMPH NODE EXC: CPT | Mod: AS,,,

## 2025-01-07 PROCEDURE — 88342 IMHCHEM/IMCYTCHM 1ST ANTB: CPT | Mod: 26,,, | Performed by: PATHOLOGY

## 2025-01-07 RX ORDER — ONDANSETRON HYDROCHLORIDE 2 MG/ML
8 INJECTION, SOLUTION INTRAVENOUS ONCE
Status: DISCONTINUED | OUTPATIENT
Start: 2025-01-07 | End: 2025-01-07

## 2025-01-07 RX ORDER — OCTREOTIDE ACETATE 500 UG/ML
500 INJECTION, SOLUTION INTRAVENOUS; SUBCUTANEOUS ONCE
Status: DISCONTINUED | OUTPATIENT
Start: 2025-01-07 | End: 2025-01-07

## 2025-01-07 RX ORDER — GABAPENTIN 300 MG/1
300 CAPSULE ORAL NIGHTLY
Status: DISCONTINUED | OUTPATIENT
Start: 2025-01-07 | End: 2025-01-09 | Stop reason: HOSPADM

## 2025-01-07 RX ORDER — DEXAMETHASONE SODIUM PHOSPHATE 4 MG/ML
INJECTION, SOLUTION INTRA-ARTICULAR; INTRALESIONAL; INTRAMUSCULAR; INTRAVENOUS; SOFT TISSUE
Status: DISCONTINUED | OUTPATIENT
Start: 2025-01-07 | End: 2025-01-07

## 2025-01-07 RX ORDER — ACETAMINOPHEN 500 MG
1000 TABLET ORAL
Status: COMPLETED | OUTPATIENT
Start: 2025-01-07 | End: 2025-01-07

## 2025-01-07 RX ORDER — MIDAZOLAM HYDROCHLORIDE 1 MG/ML
INJECTION INTRAMUSCULAR; INTRAVENOUS
Status: DISCONTINUED | OUTPATIENT
Start: 2025-01-07 | End: 2025-01-07

## 2025-01-07 RX ORDER — CEFAZOLIN 2 G/1
2 INJECTION, POWDER, FOR SOLUTION INTRAMUSCULAR; INTRAVENOUS
Status: DISCONTINUED | OUTPATIENT
Start: 2025-01-07 | End: 2025-01-07

## 2025-01-07 RX ORDER — ONDANSETRON 8 MG/1
8 TABLET, ORALLY DISINTEGRATING ORAL EVERY 8 HOURS PRN
Status: DISCONTINUED | OUTPATIENT
Start: 2025-01-07 | End: 2025-01-09 | Stop reason: HOSPADM

## 2025-01-07 RX ORDER — IPRATROPIUM BROMIDE AND ALBUTEROL SULFATE 2.5; .5 MG/3ML; MG/3ML
3 SOLUTION RESPIRATORY (INHALATION)
Status: DISCONTINUED | OUTPATIENT
Start: 2025-01-07 | End: 2025-01-09

## 2025-01-07 RX ORDER — METOPROLOL TARTRATE 25 MG/1
12.5 TABLET ORAL 2 TIMES DAILY
Status: DISCONTINUED | OUTPATIENT
Start: 2025-01-07 | End: 2025-01-09 | Stop reason: HOSPADM

## 2025-01-07 RX ORDER — GLUCAGON 1 MG
1 KIT INJECTION
Status: DISCONTINUED | OUTPATIENT
Start: 2025-01-07 | End: 2025-01-07 | Stop reason: HOSPADM

## 2025-01-07 RX ORDER — LIDOCAINE HYDROCHLORIDE 20 MG/ML
INJECTION INTRAVENOUS
Status: DISCONTINUED | OUTPATIENT
Start: 2025-01-07 | End: 2025-01-07

## 2025-01-07 RX ORDER — ROCURONIUM BROMIDE 10 MG/ML
INJECTION, SOLUTION INTRAVENOUS
Status: DISCONTINUED | OUTPATIENT
Start: 2025-01-07 | End: 2025-01-07

## 2025-01-07 RX ORDER — ONDANSETRON HYDROCHLORIDE 2 MG/ML
INJECTION, SOLUTION INTRAVENOUS
Status: DISCONTINUED | OUTPATIENT
Start: 2025-01-07 | End: 2025-01-07

## 2025-01-07 RX ORDER — BISACODYL 10 MG/1
10 SUPPOSITORY RECTAL DAILY PRN
Status: DISCONTINUED | OUTPATIENT
Start: 2025-01-07 | End: 2025-01-09 | Stop reason: HOSPADM

## 2025-01-07 RX ORDER — ATORVASTATIN CALCIUM 20 MG/1
20 TABLET, FILM COATED ORAL NIGHTLY
Status: DISCONTINUED | OUTPATIENT
Start: 2025-01-07 | End: 2025-01-09 | Stop reason: HOSPADM

## 2025-01-07 RX ORDER — BUPIVACAINE 13.3 MG/ML
INJECTION, SUSPENSION, LIPOSOMAL INFILTRATION
Status: DISCONTINUED | OUTPATIENT
Start: 2025-01-07 | End: 2025-01-07

## 2025-01-07 RX ORDER — FENTANYL CITRATE 50 UG/ML
INJECTION, SOLUTION INTRAMUSCULAR; INTRAVENOUS
Status: DISCONTINUED | OUTPATIENT
Start: 2025-01-07 | End: 2025-01-07

## 2025-01-07 RX ORDER — POLYETHYLENE GLYCOL 3350 17 G/17G
17 POWDER, FOR SOLUTION ORAL DAILY
Status: DISCONTINUED | OUTPATIENT
Start: 2025-01-07 | End: 2025-01-09 | Stop reason: HOSPADM

## 2025-01-07 RX ORDER — LIDOCAINE HYDROCHLORIDE 10 MG/ML
1 INJECTION, SOLUTION EPIDURAL; INFILTRATION; INTRACAUDAL; PERINEURAL ONCE
Status: DISCONTINUED | OUTPATIENT
Start: 2025-01-07 | End: 2025-01-07

## 2025-01-07 RX ORDER — OXYCODONE HYDROCHLORIDE 5 MG/1
5 TABLET ORAL EVERY 4 HOURS PRN
Status: DISCONTINUED | OUTPATIENT
Start: 2025-01-07 | End: 2025-01-09 | Stop reason: HOSPADM

## 2025-01-07 RX ORDER — HYDROMORPHONE HYDROCHLORIDE 1 MG/ML
0.2 INJECTION, SOLUTION INTRAMUSCULAR; INTRAVENOUS; SUBCUTANEOUS EVERY 5 MIN PRN
Status: DISCONTINUED | OUTPATIENT
Start: 2025-01-07 | End: 2025-01-07 | Stop reason: HOSPADM

## 2025-01-07 RX ORDER — CEFAZOLIN 2 G/1
2 INJECTION, POWDER, FOR SOLUTION INTRAMUSCULAR; INTRAVENOUS
Status: COMPLETED | OUTPATIENT
Start: 2025-01-07 | End: 2025-01-07

## 2025-01-07 RX ORDER — METHOCARBAMOL 500 MG/1
500 TABLET, FILM COATED ORAL 4 TIMES DAILY
Status: DISCONTINUED | OUTPATIENT
Start: 2025-01-07 | End: 2025-01-09 | Stop reason: HOSPADM

## 2025-01-07 RX ORDER — ACETAMINOPHEN 325 MG/1
650 TABLET ORAL EVERY 8 HOURS
Status: DISCONTINUED | OUTPATIENT
Start: 2025-01-07 | End: 2025-01-09 | Stop reason: HOSPADM

## 2025-01-07 RX ORDER — CEFAZOLIN SODIUM 1 G/3ML
INJECTION, POWDER, FOR SOLUTION INTRAMUSCULAR; INTRAVENOUS
Status: DISCONTINUED | OUTPATIENT
Start: 2025-01-07 | End: 2025-01-07

## 2025-01-07 RX ORDER — OXYCODONE HYDROCHLORIDE 10 MG/1
10 TABLET ORAL EVERY 4 HOURS PRN
Status: DISCONTINUED | OUTPATIENT
Start: 2025-01-07 | End: 2025-01-09 | Stop reason: HOSPADM

## 2025-01-07 RX ORDER — VASOPRESSIN 20 [USP'U]/ML
INJECTION, SOLUTION INTRAMUSCULAR; SUBCUTANEOUS
Status: DISCONTINUED | OUTPATIENT
Start: 2025-01-07 | End: 2025-01-07

## 2025-01-07 RX ORDER — ENOXAPARIN SODIUM 100 MG/ML
40 INJECTION SUBCUTANEOUS EVERY 24 HOURS
Status: DISCONTINUED | OUTPATIENT
Start: 2025-01-08 | End: 2025-01-08

## 2025-01-07 RX ORDER — AMOXICILLIN 250 MG
1 CAPSULE ORAL 2 TIMES DAILY
Status: DISCONTINUED | OUTPATIENT
Start: 2025-01-07 | End: 2025-01-09 | Stop reason: HOSPADM

## 2025-01-07 RX ORDER — BUPIVACAINE HYDROCHLORIDE 2.5 MG/ML
INJECTION, SOLUTION EPIDURAL; INFILTRATION; INTRACAUDAL
Status: DISCONTINUED | OUTPATIENT
Start: 2025-01-07 | End: 2025-01-07

## 2025-01-07 RX ORDER — HALOPERIDOL 5 MG/ML
0.5 INJECTION INTRAMUSCULAR EVERY 10 MIN PRN
Status: DISCONTINUED | OUTPATIENT
Start: 2025-01-07 | End: 2025-01-07 | Stop reason: HOSPADM

## 2025-01-07 RX ORDER — PROPOFOL 10 MG/ML
VIAL (ML) INTRAVENOUS
Status: DISCONTINUED | OUTPATIENT
Start: 2025-01-07 | End: 2025-01-07

## 2025-01-07 RX ADMIN — PROPOFOL 10 MG: 10 INJECTION, EMULSION INTRAVENOUS at 11:01

## 2025-01-07 RX ADMIN — SUGAMMADEX 400 MG: 100 INJECTION, SOLUTION INTRAVENOUS at 11:01

## 2025-01-07 RX ADMIN — VASOPRESSIN 2 UNITS: 20 INJECTION INTRAVENOUS at 10:01

## 2025-01-07 RX ADMIN — FENTANYL CITRATE 100 MCG: 50 INJECTION, SOLUTION INTRAMUSCULAR; INTRAVENOUS at 07:01

## 2025-01-07 RX ADMIN — HYDROMORPHONE HYDROCHLORIDE 0.2 MG: 1 INJECTION, SOLUTION INTRAMUSCULAR; INTRAVENOUS; SUBCUTANEOUS at 12:01

## 2025-01-07 RX ADMIN — ATORVASTATIN CALCIUM 20 MG: 20 TABLET, FILM COATED ORAL at 09:01

## 2025-01-07 RX ADMIN — METHOCARBAMOL 500 MG: 500 TABLET ORAL at 09:01

## 2025-01-07 RX ADMIN — HYDROMORPHONE HYDROCHLORIDE 0.2 MG: 1 INJECTION, SOLUTION INTRAMUSCULAR; INTRAVENOUS; SUBCUTANEOUS at 04:01

## 2025-01-07 RX ADMIN — HYDROMORPHONE HYDROCHLORIDE 0.2 MG: 1 INJECTION, SOLUTION INTRAMUSCULAR; INTRAVENOUS; SUBCUTANEOUS at 03:01

## 2025-01-07 RX ADMIN — ROCURONIUM BROMIDE 50 MG: 10 INJECTION, SOLUTION INTRAVENOUS at 08:01

## 2025-01-07 RX ADMIN — IPRATROPIUM BROMIDE AND ALBUTEROL SULFATE 3 ML: 2.5; .5 SOLUTION RESPIRATORY (INHALATION) at 07:01

## 2025-01-07 RX ADMIN — METOPROLOL TARTRATE 12.5 MG: 25 TABLET, FILM COATED ORAL at 09:01

## 2025-01-07 RX ADMIN — MIDAZOLAM HYDROCHLORIDE 2 MG: 2 INJECTION, SOLUTION INTRAMUSCULAR; INTRAVENOUS at 07:01

## 2025-01-07 RX ADMIN — SENNOSIDES AND DOCUSATE SODIUM 1 TABLET: 50; 8.6 TABLET ORAL at 12:01

## 2025-01-07 RX ADMIN — ROCURONIUM BROMIDE 20 MG: 10 INJECTION, SOLUTION INTRAVENOUS at 10:01

## 2025-01-07 RX ADMIN — ACETAMINOPHEN 650 MG: 325 TABLET ORAL at 09:01

## 2025-01-07 RX ADMIN — IPRATROPIUM BROMIDE AND ALBUTEROL SULFATE 3 ML: 2.5; .5 SOLUTION RESPIRATORY (INHALATION) at 12:01

## 2025-01-07 RX ADMIN — FENTANYL CITRATE 50 MCG: 50 INJECTION, SOLUTION INTRAMUSCULAR; INTRAVENOUS at 08:01

## 2025-01-07 RX ADMIN — ROCURONIUM BROMIDE 20 MG: 10 INJECTION, SOLUTION INTRAVENOUS at 07:01

## 2025-01-07 RX ADMIN — METHOCARBAMOL 500 MG: 500 TABLET ORAL at 05:01

## 2025-01-07 RX ADMIN — GABAPENTIN 400 MG: 300 CAPSULE ORAL at 05:01

## 2025-01-07 RX ADMIN — VASOPRESSIN 1 UNITS: 20 INJECTION INTRAVENOUS at 09:01

## 2025-01-07 RX ADMIN — ACETAMINOPHEN 1000 MG: 500 TABLET ORAL at 05:01

## 2025-01-07 RX ADMIN — VASOPRESSIN 5 UNITS: 20 INJECTION INTRAVENOUS at 11:01

## 2025-01-07 RX ADMIN — ROCURONIUM BROMIDE 30 MG: 10 INJECTION, SOLUTION INTRAVENOUS at 09:01

## 2025-01-07 RX ADMIN — SODIUM CHLORIDE, SODIUM GLUCONATE, SODIUM ACETATE, POTASSIUM CHLORIDE, MAGNESIUM CHLORIDE, SODIUM PHOSPHATE, DIBASIC, AND POTASSIUM PHOSPHATE: .53; .5; .37; .037; .03; .012; .00082 INJECTION, SOLUTION INTRAVENOUS at 07:01

## 2025-01-07 RX ADMIN — METHOCARBAMOL 500 MG: 500 TABLET ORAL at 12:01

## 2025-01-07 RX ADMIN — DEXAMETHASONE SODIUM PHOSPHATE 12 MG: 4 INJECTION, SOLUTION INTRAMUSCULAR; INTRAVENOUS at 08:01

## 2025-01-07 RX ADMIN — PROPOFOL 50 MG: 10 INJECTION, EMULSION INTRAVENOUS at 08:01

## 2025-01-07 RX ADMIN — SENNOSIDES AND DOCUSATE SODIUM 1 TABLET: 50; 8.6 TABLET ORAL at 09:01

## 2025-01-07 RX ADMIN — OXYCODONE HYDROCHLORIDE 10 MG: 10 TABLET ORAL at 05:01

## 2025-01-07 RX ADMIN — CEFAZOLIN 2 G: 2 INJECTION, POWDER, FOR SOLUTION INTRAMUSCULAR; INTRAVENOUS at 11:01

## 2025-01-07 RX ADMIN — PROPOFOL 50 MG: 10 INJECTION, EMULSION INTRAVENOUS at 07:01

## 2025-01-07 RX ADMIN — IPRATROPIUM BROMIDE AND ALBUTEROL SULFATE 3 ML: 2.5; .5 SOLUTION RESPIRATORY (INHALATION) at 04:01

## 2025-01-07 RX ADMIN — CEFAZOLIN 3 G: 330 INJECTION, POWDER, FOR SOLUTION INTRAMUSCULAR; INTRAVENOUS at 08:01

## 2025-01-07 RX ADMIN — OXYCODONE HYDROCHLORIDE 10 MG: 10 TABLET ORAL at 09:01

## 2025-01-07 RX ADMIN — LIDOCAINE HYDROCHLORIDE 100 MG: 20 INJECTION INTRAVENOUS at 07:01

## 2025-01-07 RX ADMIN — CEFAZOLIN 2 G: 2 INJECTION, POWDER, FOR SOLUTION INTRAMUSCULAR; INTRAVENOUS at 04:01

## 2025-01-07 RX ADMIN — OXYCODONE HYDROCHLORIDE 10 MG: 10 TABLET ORAL at 12:01

## 2025-01-07 RX ADMIN — PROPOFOL 50 MCG/KG/MIN: 10 INJECTION, EMULSION INTRAVENOUS at 09:01

## 2025-01-07 RX ADMIN — ACETAMINOPHEN 650 MG: 325 TABLET ORAL at 01:01

## 2025-01-07 RX ADMIN — ROCURONIUM BROMIDE 80 MG: 10 INJECTION, SOLUTION INTRAVENOUS at 07:01

## 2025-01-07 RX ADMIN — PROPOFOL 200 MG: 10 INJECTION, EMULSION INTRAVENOUS at 07:01

## 2025-01-07 RX ADMIN — POLYETHYLENE GLYCOL 3350 17 G: 17 POWDER, FOR SOLUTION ORAL at 12:01

## 2025-01-07 RX ADMIN — ONDANSETRON 4 MG: 2 INJECTION INTRAMUSCULAR; INTRAVENOUS at 11:01

## 2025-01-07 RX ADMIN — GABAPENTIN 300 MG: 300 CAPSULE ORAL at 09:01

## 2025-01-07 RX ADMIN — SODIUM CHLORIDE, SODIUM GLUCONATE, SODIUM ACETATE, POTASSIUM CHLORIDE, MAGNESIUM CHLORIDE, SODIUM PHOSPHATE, DIBASIC, AND POTASSIUM PHOSPHATE: .53; .5; .37; .037; .03; .012; .00082 INJECTION, SOLUTION INTRAVENOUS at 09:01

## 2025-01-07 RX ADMIN — METOPROLOL TARTRATE 12.5 MG: 25 TABLET, FILM COATED ORAL at 12:01

## 2025-01-07 NOTE — BRIEF OP NOTE
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 En block wedge resection of RML and RUL. Additional wedge taken for margin. Mediastinal lymph node dissection. Right 24 Fr gracia drain left.     Estimated Blood Loss: * No values recorded between 1/7/2025  8:27 AM and 1/7/2025 11:53 AM *    Estimated Blood Loss has been documented.         Specimens:   Specimen (24h ago, onward)       Start     Ordered    01/07/25 1043  Specimen to Pathology, Surgery Pulmonary and Thoracic  Once        Comments: Pre-op Diagnosis: Carcinoid tumor of lung, unspecified whether malignant [D3A.090]Procedure(s):XI ROBOTIC WEDGE RESECTION, LUNG (RATS)BRONCHOSCOPYXI ROBOTIC RATS, WITH LYMPHADENECTOMYBLOCK, NERVE, INTERCOSTAL, 2 OR MORE Number of specimens: 6Name of specimens:1. Right Middle & Upper Lobe Wedge Enbloc - Permanent    Short Stitch = Superior Medial Margin2. Level 9 - Permanent3. Level 7 - Permanent4. Level 4 - Permanent5. Level 10 - Permanent6. Right Upper Lobe Additional Wedge - Permanent    Long Stitch = additional margin     References:    Click here for ordering Quick Tip   Question Answer Comment   Procedure Type: Pulmonary and Thoracic    Specimen Class: Routine/Screening    Release to patient Immediate        01/07/25 1104    01/07/25 1029  Specimen to Pathology,  Surgery Pulmonary and Thoracic  Once        Comments: Pre-op Diagnosis: Carcinoid tumor of lung, unspecified whether malignant [D3A.090]Procedure(s):XI ROBOTIC WEDGE RESECTION, LUNG (RATS)BRONCHOSCOPYXI ROBOTIC RATS, WITH LYMPHADENECTOMYBLOCK, NERVE, INTERCOSTAL, 2 OR MORE Number of specimens: 5Name of specimens: 1. Right Middle & Upper Lobe Wedge Enbloc - Permanent2. Level 9 - Permanent3. Level 7 - Permanent4. Level 4 - Permanent5. Level 10 - Permanent     References:    Click here for ordering Quick Tip   Question Answer Comment   Procedure Type: Pulmonary and Thoracic    Specimen Class: Routine/Screening    Release to patient Immediate        01/07/25 1028                    [id number]

## 2025-01-07 NOTE — ASSESSMENT & PLAN NOTE
Felicity Culp is a 69 y.o. female who presents for resection of the RML carcinoid tumor.    -Surgical and blood consent signed at bedside  -Patient marked  -Proceed to surgery as planned

## 2025-01-07 NOTE — ANESTHESIA PROCEDURE NOTES
Intubation    Date/Time: 1/7/2025 7:24 AM    Performed by: Coretta Whitehead DO  Authorized by: Blane Colunga MD    Intubation:     Induction:  Intravenous    Intubated:  Postinduction    Mask Ventilation:  Easy with oral airway    Attempts:  1    Attempted By:  Resident anesthesiologist    Method of Intubation:  Video laryngoscopy    Blade:  Sherif 3    Laryngeal View Grade: Grade I - full view of cords      Difficult Airway Encountered?: No      Complications:  None    Airway Device:  Oral endotracheal tube    Airway Device Size:  8.0    Style/Cuff Inflation:  Cuffed    Tube secured:  23    Secured at:  The lips    Placement Verified By:  Capnometry    Complicating Factors:  None    Findings Post-Intubation:  BS equal bilateral and atraumatic/condition of teeth unchanged

## 2025-01-07 NOTE — PLAN OF CARE
Prepped for surgery. Unable to obtain airstrip due to computer error. Surgical and blood consents obtained. Family at bedside.

## 2025-01-07 NOTE — H&P
[facility] - Surgery ([facility])  Thoracic Surgery  History & Physical    Patient Name: Felicity Culp  MRN: [id number]  Admission Date: 1/7/2025  Attending Physician: Ke Thompson MD  Primary Care Provider: Katherine Baltazar MD    Patient information was obtained from patient, past medical records, and ER records.     Subjective:     Chief Complaint/Reason for Admission: RML Carcinoid Tumor    History of Present Illness:   From initial clinic visit 12/6/24: Patient is a 69 y.o. 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  PMHistory of obstructive sleep apnea, obesity, hypertension, (coreg, losartan). S/p Thyroidectomy (~2009) on synthroid. CT chest 9/9/24 with RML noncalcified nodule measuring 1.9 cm. PET with SUV max 4.1 and avid left axillary lymphadenectomy. Percutaneous biopsy returned carcinoid.  Axillary lymph node negative for malignancy.      Patient denies cough, denies hemoptysis. Denies weight loss.      PSH: spinal fusion, appendectomy, carpal tunnel release  Never smoker    Interval History: Ms. Culp states she had the flu shortly after her clinic visit, but has since recovered. She denies any other changes in health history. She denies any recent fevers, chills, cough, shortness of breath or chest pain.         No current facility-administered medications on file prior to encounter.     Current Outpatient Medications on File Prior to Encounter   Medication Sig    atorvastatin (LIPITOR) 20 MG tablet TAKE 1 TABLET BY MOUTH DAILY IN THE EVENING AFTER SUPPER    carvediloL (COREG) 3.125 MG tablet     ferrous sulfate (FEOSOL) 325 mg (65 mg iron) Tab tablet Take 325 mg by mouth daily with breakfast.    levothyroxine (SYNTHROID, LEVOTHROID) 175 MCG tablet Take 1 tablet by mouth once daily.    losartan (COZAAR) 50 MG tablet Take 1 tablet by mouth once daily.    albuterol (PROVENTIL/VENTOLIN HFA) 90 mcg/actuation inhaler     cetirizine (ZYRTEC) 10 MG tablet Take 1 tablet by  mouth once daily. (Patient not taking: Reported on 1/6/2025)    cholecalciferol, vitamin D3, 1,250 mcg (50,000 unit) capsule Take 1 capsule by mouth every 7 days.    clopidogreL (PLAVIX) 75 mg tablet Take 75 mg by mouth.    fluticasone propionate (FLONASE) 50 mcg/actuation nasal spray  (Patient not taking: Reported on 1/6/2025)    furosemide (LASIX) 20 MG tablet Take by mouth. (Patient not taking: Reported on 9/25/2024)    metFORMIN (GLUCOPHAGE) 500 MG tablet TAKE 1 TABLET BY MOUTH DAILY TAKE WITH FOOOD (Patient not taking: Reported on 1/6/2025)       Review of patient's allergies indicates:  No Known Allergies    Past Medical History:   Diagnosis Date    Hypertension     Sleep apnea     Thyroid disease      Past Surgical History:   Procedure Laterality Date    APPENDECTOMY  2005    CARPAL TUNNEL RELEASE  2003    SPINAL FUSION  2015     Family History    None       Tobacco Use    Smoking status: Never    Smokeless tobacco: Never   Substance and Sexual Activity    Alcohol use: Not Currently    Drug use: Never    Sexual activity: Not on file     Review of Systems   Constitutional:  Negative for chills and fever.   Respiratory:  Negative for chest tightness and shortness of breath.    Cardiovascular:  Negative for chest pain.   Gastrointestinal:  Negative for abdominal distention.     Objective:     Vital Signs (Most Recent):  Temp: 98.1 °F (36.7 °C) (01/07/25 0532)  Pulse: 93 (01/07/25 0616)  Resp: 20 (01/07/25 0532)  BP: 138/65 (01/07/25 0533)  SpO2: 100 % (01/07/25 0532) Vital Signs (24h Range):  Temp:  [98.1 °F (36.7 °C)] 98.1 °F (36.7 °C)  Pulse:  [93] 93  Resp:  [20] 20  SpO2:  [100 %] 100 %  BP: (138)/(65) 138/65     Weight: 130.5 kg (287 lb 11.2 oz)  Body mass index is 47.88 kg/m².    Intake/Output - Last 3 Shifts       None            SpO2: 100 %        Physical Exam  Vitals reviewed.   Constitutional:       General: She is not in acute distress.  HENT:      Head: Normocephalic.   Eyes:      General: No  scleral icterus.     Extraocular Movements: Extraocular movements intact.   Cardiovascular:      Rate and Rhythm: Normal rate.   Pulmonary:      Effort: Pulmonary effort is normal. No respiratory distress.   Abdominal:      General: There is no distension.      Palpations: Abdomen is soft.   Skin:     General: Skin is warm and dry.   Neurological:      General: No focal deficit present.      Mental Status: She is alert and oriented to person, place, and time.            Significant Labs:  All pertinent labs from the last 24 hours have been reviewed.    Significant Diagnostics:  I have reviewed and interpreted all pertinent imaging results/findings within the past 24 hours.    VTE Risk Mitigation (From admission, onward)           Ordered     IP VTE HIGH RISK PATIENT  Once         01/07/25 0512     Place PHILOMENA hose  Until discontinued         01/07/25 0512     Place sequential compression device  Until discontinued         01/07/25 0512                    Assessment/Plan:     * Carcinoid tumor of right lung  Felicity Culp is a 69 y.o. female who presents for resection of the RML carcinoid tumor.    -Surgical and blood consent signed at bedside  -Patient marked  -Proceed to surgery as planned        Smith Beltran MD  Thoracic Surgery  [facility] - Surgery ([facility])

## 2025-01-07 NOTE — OPERATIVE NOTE ADDENDUM
Certification of Assistant at Surgery       Surgery Date: 1/7/2025     Participating Surgeons:  Surgeons and Role:     * Ke Thompson MD - Primary     * Tushar Martinez PA-C - Assisting     * Smith Beltran MD - Resident - Assisting    Procedures:  Procedure(s) (LRB):  XI ROBOTIC WEDGE RESECTION, LUNG (RATS) (Right)  BRONCHOSCOPY (N/A)  XI ROBOTIC RATS, WITH LYMPHADENECTOMY (Right)  BLOCK, NERVE, INTERCOSTAL, 2 OR MORE    Assistant Surgeon's Certification of Necessity:  I understand that section 1842 (b) (6) (d) of the Social Security Act generally prohibits Medicare Part B reasonable charge payment for the services of assistants at surgery in teaching hospitals when qualified residents are available to furnish such services. I certify that the services for which payment is claimed were medically necessary, and that no qualified resident was available to perform the services. I further understand that these services are subject to post-payment review by the Medicare carrier.      Tushar Martinez PA-C    01/07/2025  1:51 PM

## 2025-01-07 NOTE — SUBJECTIVE & OBJECTIVE
No current facility-administered medications on file prior to encounter.     Current Outpatient Medications on File Prior to Encounter   Medication Sig    atorvastatin (LIPITOR) 20 MG tablet TAKE 1 TABLET BY MOUTH DAILY IN THE EVENING AFTER SUPPER    carvediloL (COREG) 3.125 MG tablet     ferrous sulfate (FEOSOL) 325 mg (65 mg iron) Tab tablet Take 325 mg by mouth daily with breakfast.    levothyroxine (SYNTHROID, LEVOTHROID) 175 MCG tablet Take 1 tablet by mouth once daily.    losartan (COZAAR) 50 MG tablet Take 1 tablet by mouth once daily.    albuterol (PROVENTIL/VENTOLIN HFA) 90 mcg/actuation inhaler     cetirizine (ZYRTEC) 10 MG tablet Take 1 tablet by mouth once daily. (Patient not taking: Reported on 1/6/2025)    cholecalciferol, vitamin D3, 1,250 mcg (50,000 unit) capsule Take 1 capsule by mouth every 7 days.    clopidogreL (PLAVIX) 75 mg tablet Take 75 mg by mouth.    fluticasone propionate (FLONASE) 50 mcg/actuation nasal spray  (Patient not taking: Reported on 1/6/2025)    furosemide (LASIX) 20 MG tablet Take by mouth. (Patient not taking: Reported on 9/25/2024)    metFORMIN (GLUCOPHAGE) 500 MG tablet TAKE 1 TABLET BY MOUTH DAILY TAKE WITH FOOOD (Patient not taking: Reported on 1/6/2025)       Review of patient's allergies indicates:  No Known Allergies    Past Medical History:   Diagnosis Date    Hypertension     Sleep apnea     Thyroid disease      Past Surgical History:   Procedure Laterality Date    APPENDECTOMY  2005    CARPAL TUNNEL RELEASE  2003    SPINAL FUSION  2015     Family History    None       Tobacco Use    Smoking status: Never    Smokeless tobacco: Never   Substance and Sexual Activity    Alcohol use: Not Currently    Drug use: Never    Sexual activity: Not on file     Review of Systems   Constitutional:  Negative for chills and fever.   Respiratory:  Negative for chest tightness and shortness of breath.    Cardiovascular:  Negative for chest pain.   Gastrointestinal:  Negative for  abdominal distention.     Objective:     Vital Signs (Most Recent):  Temp: 98.1 °F (36.7 °C) (01/07/25 0532)  Pulse: 93 (01/07/25 0616)  Resp: 20 (01/07/25 0532)  BP: 138/65 (01/07/25 0533)  SpO2: 100 % (01/07/25 0532) Vital Signs (24h Range):  Temp:  [98.1 °F (36.7 °C)] 98.1 °F (36.7 °C)  Pulse:  [93] 93  Resp:  [20] 20  SpO2:  [100 %] 100 %  BP: (138)/(65) 138/65     Weight: 130.5 kg (287 lb 11.2 oz)  Body mass index is 47.88 kg/m².    Intake/Output - Last 3 Shifts       None            SpO2: 100 %        Physical Exam  Vitals reviewed.   Constitutional:       General: She is not in acute distress.  HENT:      Head: Normocephalic.   Eyes:      General: No scleral icterus.     Extraocular Movements: Extraocular movements intact.   Cardiovascular:      Rate and Rhythm: Normal rate.   Pulmonary:      Effort: Pulmonary effort is normal. No respiratory distress.   Abdominal:      General: There is no distension.      Palpations: Abdomen is soft.   Skin:     General: Skin is warm and dry.   Neurological:      General: No focal deficit present.      Mental Status: She is alert and oriented to person, place, and time.            Significant Labs:  All pertinent labs from the last 24 hours have been reviewed.    Significant Diagnostics:  I have reviewed and interpreted all pertinent imaging results/findings within the past 24 hours.    VTE Risk Mitigation (From admission, onward)           Ordered     IP VTE HIGH RISK PATIENT  Once         01/07/25 0512     Place PHILOMENA hose  Until discontinued         01/07/25 0512     Place sequential compression device  Until discontinued         01/07/25 0512

## 2025-01-07 NOTE — OP NOTE
Thoracic Surgery Operative Report       Date:  01/07/2025    Preoperative Diagnosis: Right middle lobe lung nodule    Postoperative Diagnosis:  NSCLC      Procedures Performed:  1. Flexible Bronchoscopy  2. Robotic-Assisted Right midde lobe & upper lobe en bloc Non-anatomic Wedge Resection (Therapeutic Wedge resection)  3. Mediastinal Lymphadenectomy  4. Intercostal Nerve Blocks       Intraoperative Findings: RML lobe nodule involving the upper lobe with a incomplete oblique fissure       Surgeon: Ke Thompson M.D.    Fellow/Resident:  Smith Beltran MD       Assistants: Bedside assistant attestation:  Tushar Martinez PA-C functioned as a bedside 1st assistant.  His duties included robotic docking, instrument exchange, and specimen retrieval throughout the entire surgery.  There was no qualified resident available to function as a bedside first assistant given the case complexity and extent of duties described above.      Anesthesia Type: General Anesthesia.       Estimated Blood Loss: 50 mL.      Intravenous Fluid: See Anesthesia Record.       Specimens:   Specimen (24h ago, onward)       Start     Ordered    01/07/25 1043  Specimen to Pathology, Surgery Pulmonary and Thoracic  Once        Comments: Pre-op Diagnosis: Carcinoid tumor of lung, unspecified whether malignant [D3A.090]Procedure(s):XI ROBOTIC WEDGE RESECTION, LUNG (RATS)BRONCHOSCOPYXI ROBOTIC RATS, WITH LYMPHADENECTOMYBLOCK, NERVE, INTERCOSTAL, 2 OR MORE Number of specimens: 6Name of specimens:1. Right Middle & Upper Lobe Wedge Enbloc - Permanent    Short Stitch = Superior Medial Margin2. Level 9 - Permanent3. Level 7 - Permanent4. Level 4 - Permanent5. Level 10 - Permanent6. Right Upper Lobe Additional Wedge - Permanent    Long Stitch = additional margin     References:    Click here for ordering Quick Tip   Question Answer Comment   Procedure Type: Pulmonary and Thoracic    Specimen Class: Routine/Screening    Release to patient Immediate         01/07/25 1104    01/07/25 1029  Specimen to Pathology, Surgery Pulmonary and Thoracic  Once        Comments: Pre-op Diagnosis: Carcinoid tumor of lung, unspecified whether malignant [D3A.090]Procedure(s):XI ROBOTIC WEDGE RESECTION, LUNG (RATS)BRONCHOSCOPYXI ROBOTIC RATS, WITH LYMPHADENECTOMYBLOCK, NERVE, INTERCOSTAL, 2 OR MORE Number of specimens: 5Name of specimens: 1. Right Middle & Upper Lobe Wedge Enbloc - Permanent2. Level 9 - Permanent3. Level 7 - Permanent4. Level 4 - Permanent5. Level 10 - Permanent     References:    Click here for ordering Quick Tip   Question Answer Comment   Procedure Type: Pulmonary and Thoracic    Specimen Class: Routine/Screening    Release to patient Immediate        01/07/25 1028                      Drains: 24 Urdu Chest Tube.      Complications: None.       Disposition: The patient tolerated the operation well and was extubated and transported the postanesthesia care unit in stable condition.       Indication for the procedure:   Mrs Culp is a 69 year old never smoker, with multiple comorbidities who presented with a right middle lobe  nodule. The nodule was biopsy proven to be carcinoid tumor. After extensive medical & oncological work-up and discussion at multi-disciplinary cancer conference, she was deemed a suitable surgical candidate for a planned wedge resection, possible lobectomy. The patient presents today for resection.  I discussed with the patient the risks, benefits and alternatives to surgery. Specifically I informed her of the risk of bleeding, infection, injury to nearby structures within the chest and conversion to an open procedure. In addition, we discussed that the nodule may be benign. I discussed with her that although there does not appear to be evidence of regional or distant disease there may evidence of spread at the time of surgery or when the specimen is examined by pathologist. Following this conversation the patient consented to surgery.        Description of the operation:   Mrs Culp was transferred to the operating room. Intravenous lines and an arterial line were placed by the anesthesia team. The patient was intubated with a single-lumen endotracheal tube by anesthesia. General anesthesia was induced. The patient received 2 g of IV Ancef prior to the incision for antimicrobial prophylaxis. Compression boots were placed in the lower extremities with DVT prophylaxis. After a time out, a flexible bronchoscopy was performed. The flexible bronchoscope was advanced through the endotracheal tube advanced to the mireya. The mireya appeared to be sharp. The right mainstem bronchus was within normal limits. The right upper lobe bronchial orifice was normal. The bronchus intermedius was normal. The right middle lobe and right lower lobe bronchial orifices were normal. Left mainstem bronchus was entered and was normal. The left upper lobe and left lower lobe bronchial orifices were normal. Secretions were noted to be minmal. The patient was then intubated with a double-lumen endotracheal tube and positioned in the left lateral decubitus position with the right  chest facing upward.       The right chest was prepped and draped in the standard sterile surgical fashion. A total of four robotic trocars were placed along the 8th intercostal space approximately 8-10 cm apart (anterior to posterior 12mm, 8mm, 8mm, and 8mm). A 12 mm Air Seal Port was placed in the 10 th intercostal space. The 8mm 30 degree robotic camera was placed thorough the posterior axillary line trocar. The right hemithorax was inspected. There was no evidence of pleural effusion or pleural dissemination and no effusion. Their were adhesions of the lower lobe to the diaphragm Also of note there was incomplete fissure with near fusion between the upper and middle lobe medially.       The "[employer]i Xi robot was then docked to the robotic ports. A Cadiere grasper and a bipolar forceps were placed  through the ports. The middle lobe nodule  was identified, as there was no puckering of the visceral pleural. A wide area of surrounding parenchyma was demarcated, the lung was grasped and a generous wedge was performed with sequential applications of the green load robotic stapler. The staple lines were hemostatic. The specimen was removed through the airseal port using an endocatch bag.  The specimen was inspected and the nodule appeared to be clear from, but close to the superior medial margin. As such an additional wedge resection of the upper lobe was taken, which include the previous staple line.     We proceeded with our mediastinal lymphadenectomy. The inferior pulmonary ligament was incised and the the mediastinal pleura was incised posteriorly along the bronchus intermedius with the bilpolar grapser to the level of the azygos vein. Level 7, 9 were taken Similarly the lung was reflected caudally which facilitated dissection of the right paratracheal space. The level 4 were identified, removed en bloc and sent for permanent pathology. We then reflected the lung posterolaterally to expose the anterior hilum and there was a level 10 lymph node between the middle lobe vein and phrenic nerve. This node was removed with careful attention not to injure the phrenic nerve     The chest was inspected and there was no evidence of bleeding.  A 24 Romanian gracia drain was placed through the most anterior 8th intercostal space thoracoscopic port and secured with a #0 Ethibond suture. The chest tube was connected to Pleur-evac and placed on -20 cm of suction. The right lung was  reexpanded under direct vision. The remaining thoracoscopic incisions were closed in 3 layers. The muscle layers were reapproximated with a running with a #0 Vicryl suture. The subcutaneous layer was reapproximated with a running 2-0 suture. The skin was reapproximated with running 4-0 Vicryl suture. The sponge and instrument counts were correct x  2. I was present for the entire procedure and participated in the entire procedure. There were no intraoperative complications. The patient was extubated and transported to postanesthesia care unit in stable condition.     Thoracic (Pulmonary) Cancer - Synoptic Operative Report Summary    Side of surgery Right   Surgical approach Robotic   Tumor type Carcinoid tumor   Which lymph nodes were submitted as separate specimens? 4R - Lower Paratracheal (right), 7 - Subcarinal, 9R - Pulmonary ligament (right), and 10R - Hilar (right)   What pre-operative therapies did the patient receive? None

## 2025-01-07 NOTE — HPI
From initial clinic visit 12/6/24: Patient is a 69 y.o. 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  PMHistory of obstructive sleep apnea, obesity, hypertension, (coreg, losartan). S/p Thyroidectomy (~2009) on synthroid. CT chest 9/9/24 with RML noncalcified nodule measuring 1.9 cm. PET with SUV max 4.1 and avid left axillary lymphadenectomy. Percutaneous biopsy returned carcinoid.  Axillary lymph node negative for malignancy.      Patient denies cough, denies hemoptysis. Denies weight loss.      PSH: spinal fusion, appendectomy, carpal tunnel release  Never smoker    Interval History: Ms. Culp states she had the flu shortly after her clinic visit, but has since recovered. She denies any other changes in health history. She denies any recent fevers, chills, cough, shortness of breath or chest pain.

## 2025-01-07 NOTE — TRANSFER OF CARE
"Anesthesia Transfer of Care Note    Patient: Felicity Culp    Procedure(s) Performed: Procedure(s) (LRB):  XI ROBOTIC WEDGE RESECTION, LUNG (RATS) (Right)  BRONCHOSCOPY (N/A)  XI ROBOTIC RATS, WITH LYMPHADENECTOMY (Right)  BLOCK, NERVE, INTERCOSTAL, 2 OR MORE    Patient location: PACU    Anesthesia Type: general    Transport from OR: Transported from OR on 6-10 L/min O2 by face mask with adequate spontaneous ventilation    Post pain: adequate analgesia    Post assessment: no apparent anesthetic complications and tolerated procedure well    Post vital signs: stable    Level of consciousness: responds to stimulation    Nausea/Vomiting: no nausea/vomiting    Complications: none    Transfer of care protocol was followed      Last vitals: Visit Vitals  /65   Pulse 93   Temp 36.7 °C (98.1 °F) (Temporal)   Resp 20   Ht 5' 5" (1.651 m)   Wt 130.5 kg (287 lb 11.2 oz)   LMP  (LMP Unknown)   SpO2 100%   Breastfeeding No   BMI 47.88 kg/m²     "

## 2025-01-07 NOTE — ANESTHESIA PROCEDURE NOTES
Intubation    Date/Time: 1/7/2025 7:45 AM    Performed by: Coretta Whitehead DO  Authorized by: Blane Colunga MD    Intubation:     Induction:  Intravenous    Intubated:  Postinduction    Mask Ventilation:  Not attempted    Attempts:  1    Attempted By:  Resident anesthesiologist    Method of Intubation:  Video laryngoscopy    Blade:  [place] 3    Laryngeal View Grade: Grade I - full view of cords      Difficult Airway Encountered?: No      Complications:  None    Airway Device:  Double lumen tube left    Airway Device Size:  35F    Style/Cuff Inflation:  Cuffed    Tube secured:  29    Secured at:  The lips    Placement Verified By:  Capnometry and Fiber optic visualization    Complicating Factors:  None    Findings Post-Intubation:  BS equal bilateral and atraumatic/condition of teeth unchanged

## 2025-01-07 NOTE — ANESTHESIA PROCEDURE NOTES
Arterial    Diagnosis: carcinoid tumor of the lung    Patient location during procedure: done in OR    Staffing  Authorizing Provider: Blane Colunga MD  Performing Provider: Coretta Whitehead DO    Staffing  Performed by: Coretta Whitehead DO  Authorized by: Blane Colunga MD    Anesthesiologist was present at the time of the procedure.    Preanesthetic Checklist  Completed: patient identified, IV checked, site marked, risks and benefits discussed, surgical consent, monitors and equipment checked, pre-op evaluation, timeout performed and anesthesia consent givenArterial  Skin Prep: chlorhexidine gluconate and isopropyl alcohol  Local Infiltration: none  Orientation: left  Location: radial    Catheter Size: 20 G  Catheter placement by Ultrasound guidance. Heme positive aspiration all ports.   Vessel Caliber: patent  Needle advanced into vessel with real time Ultrasound guidance.Insertion Attempts: 2  Assessment  Dressing: secured with tape and tegaderm  Patient: Tolerated well

## 2025-01-07 NOTE — NURSING TRANSFER
Nursing Transfer Note      1/7/2025   1:19 PM    Nurse giving handoff:Andrés BAR Rn  Nurse receiving handoff: Francis     Reason patient is being transferred: postop    Transfer To: 1048    Transfer via stretcher    Transfer with n/a    Transported by Rn and pacu transport    Transfer Vital Signs:  Blood Pressure: see flowsheet  Heart Rate:  O2:  Temperature:  Respirations:    Telemetry: yes  Order for Tele Monitor? Yes    Additional Lines: n/a    Medicines sent: n/a    Any special needs or follow-up needed:     Patient belongings transferred with patient:  n/a    Chart send with patient: Yes    Notified: sister    /Patient reassessed at: 1/7/25  1  Upon arrival to floor: bed in lowest position

## 2025-01-08 LAB
ANION GAP SERPL CALC-SCNC: 8 MMOL/L (ref 8–16)
ANION GAP SERPL CALC-SCNC: 9 MMOL/L (ref 8–16)
BASOPHILS # BLD AUTO: 0.01 K/UL (ref 0–0.2)
BASOPHILS NFR BLD: 0.1 % (ref 0–1.9)
BUN SERPL-MCNC: 14 MG/DL (ref 8–23)
BUN SERPL-MCNC: 14 MG/DL (ref 8–23)
CALCIUM SERPL-MCNC: 8.7 MG/DL (ref 8.7–10.5)
CALCIUM SERPL-MCNC: 8.7 MG/DL (ref 8.7–10.5)
CHLORIDE SERPL-SCNC: 103 MMOL/L (ref 95–110)
CHLORIDE SERPL-SCNC: 103 MMOL/L (ref 95–110)
CO2 SERPL-SCNC: 22 MMOL/L (ref 23–29)
CO2 SERPL-SCNC: 23 MMOL/L (ref 23–29)
CREAT SERPL-MCNC: 0.7 MG/DL (ref 0.5–1.4)
CREAT SERPL-MCNC: 0.8 MG/DL (ref 0.5–1.4)
DIFFERENTIAL METHOD BLD: ABNORMAL
EOSINOPHIL # BLD AUTO: 0 K/UL (ref 0–0.5)
EOSINOPHIL NFR BLD: 0 % (ref 0–8)
ERYTHROCYTE [DISTWIDTH] IN BLOOD BY AUTOMATED COUNT: 13.9 % (ref 11.5–14.5)
EST. GFR  (NO RACE VARIABLE): >60 ML/MIN/1.73 M^2
EST. GFR  (NO RACE VARIABLE): >60 ML/MIN/1.73 M^2
GLUCOSE SERPL-MCNC: 112 MG/DL (ref 70–110)
GLUCOSE SERPL-MCNC: 112 MG/DL (ref 70–110)
HCT VFR BLD AUTO: 33.2 % (ref 37–48.5)
HGB BLD-MCNC: 10.3 G/DL (ref 12–16)
IMM GRANULOCYTES # BLD AUTO: 0.04 K/UL (ref 0–0.04)
IMM GRANULOCYTES NFR BLD AUTO: 0.4 % (ref 0–0.5)
LYMPHOCYTES # BLD AUTO: 0.9 K/UL (ref 1–4.8)
LYMPHOCYTES NFR BLD: 8.2 % (ref 18–48)
MCH RBC QN AUTO: 27.5 PG (ref 27–31)
MCHC RBC AUTO-ENTMCNC: 31 G/DL (ref 32–36)
MCV RBC AUTO: 89 FL (ref 82–98)
MONOCYTES # BLD AUTO: 0.6 K/UL (ref 0.3–1)
MONOCYTES NFR BLD: 6.2 % (ref 4–15)
NEUTROPHILS # BLD AUTO: 8.8 K/UL (ref 1.8–7.7)
NEUTROPHILS NFR BLD: 85.1 % (ref 38–73)
NRBC BLD-RTO: 0 /100 WBC
PLATELET # BLD AUTO: 259 K/UL (ref 150–450)
PMV BLD AUTO: 11.6 FL (ref 9.2–12.9)
POTASSIUM SERPL-SCNC: 4 MMOL/L (ref 3.5–5.1)
POTASSIUM SERPL-SCNC: 4 MMOL/L (ref 3.5–5.1)
RBC # BLD AUTO: 3.75 M/UL (ref 4–5.4)
SODIUM SERPL-SCNC: 134 MMOL/L (ref 136–145)
SODIUM SERPL-SCNC: 134 MMOL/L (ref 136–145)
WBC # BLD AUTO: 10.38 K/UL (ref 3.9–12.7)

## 2025-01-08 PROCEDURE — 94640 AIRWAY INHALATION TREATMENT: CPT

## 2025-01-08 PROCEDURE — 25000003 PHARM REV CODE 250

## 2025-01-08 PROCEDURE — 97535 SELF CARE MNGMENT TRAINING: CPT

## 2025-01-08 PROCEDURE — 85025 COMPLETE CBC W/AUTO DIFF WBC: CPT | Performed by: STUDENT IN AN ORGANIZED HEALTH CARE EDUCATION/TRAINING PROGRAM

## 2025-01-08 PROCEDURE — 63600175 PHARM REV CODE 636 W HCPCS

## 2025-01-08 PROCEDURE — 80048 BASIC METABOLIC PNL TOTAL CA: CPT

## 2025-01-08 PROCEDURE — 99900035 HC TECH TIME PER 15 MIN (STAT)

## 2025-01-08 PROCEDURE — 97112 NEUROMUSCULAR REEDUCATION: CPT

## 2025-01-08 PROCEDURE — 97161 PT EVAL LOW COMPLEX 20 MIN: CPT

## 2025-01-08 PROCEDURE — 20600001 HC STEP DOWN PRIVATE ROOM

## 2025-01-08 PROCEDURE — 25000242 PHARM REV CODE 250 ALT 637 W/ HCPCS

## 2025-01-08 PROCEDURE — 94761 N-INVAS EAR/PLS OXIMETRY MLT: CPT

## 2025-01-08 PROCEDURE — 36415 COLL VENOUS BLD VENIPUNCTURE: CPT

## 2025-01-08 PROCEDURE — 97165 OT EVAL LOW COMPLEX 30 MIN: CPT

## 2025-01-08 RX ORDER — ENOXAPARIN SODIUM 100 MG/ML
40 INJECTION SUBCUTANEOUS EVERY 12 HOURS
Status: DISCONTINUED | OUTPATIENT
Start: 2025-01-08 | End: 2025-01-09 | Stop reason: HOSPADM

## 2025-01-08 RX ORDER — FUROSEMIDE 10 MG/ML
40 INJECTION INTRAMUSCULAR; INTRAVENOUS ONCE
Status: COMPLETED | OUTPATIENT
Start: 2025-01-08 | End: 2025-01-08

## 2025-01-08 RX ADMIN — OXYCODONE HYDROCHLORIDE 10 MG: 10 TABLET ORAL at 10:01

## 2025-01-08 RX ADMIN — GABAPENTIN 300 MG: 300 CAPSULE ORAL at 08:01

## 2025-01-08 RX ADMIN — ENOXAPARIN SODIUM 40 MG: 40 INJECTION SUBCUTANEOUS at 11:01

## 2025-01-08 RX ADMIN — OXYCODONE HYDROCHLORIDE 10 MG: 10 TABLET ORAL at 06:01

## 2025-01-08 RX ADMIN — METHOCARBAMOL 500 MG: 500 TABLET ORAL at 08:01

## 2025-01-08 RX ADMIN — OXYCODONE HYDROCHLORIDE 10 MG: 10 TABLET ORAL at 11:01

## 2025-01-08 RX ADMIN — IPRATROPIUM BROMIDE AND ALBUTEROL SULFATE 3 ML: 2.5; .5 SOLUTION RESPIRATORY (INHALATION) at 08:01

## 2025-01-08 RX ADMIN — ATORVASTATIN CALCIUM 20 MG: 20 TABLET, FILM COATED ORAL at 08:01

## 2025-01-08 RX ADMIN — ACETAMINOPHEN 650 MG: 325 TABLET ORAL at 02:01

## 2025-01-08 RX ADMIN — ENOXAPARIN SODIUM 40 MG: 40 INJECTION SUBCUTANEOUS at 08:01

## 2025-01-08 RX ADMIN — SENNOSIDES AND DOCUSATE SODIUM 1 TABLET: 50; 8.6 TABLET ORAL at 08:01

## 2025-01-08 RX ADMIN — IPRATROPIUM BROMIDE AND ALBUTEROL SULFATE 3 ML: 2.5; .5 SOLUTION RESPIRATORY (INHALATION) at 01:01

## 2025-01-08 RX ADMIN — METHOCARBAMOL 500 MG: 500 TABLET ORAL at 02:01

## 2025-01-08 RX ADMIN — ACETAMINOPHEN 650 MG: 325 TABLET ORAL at 05:01

## 2025-01-08 RX ADMIN — OXYCODONE HYDROCHLORIDE 10 MG: 10 TABLET ORAL at 08:01

## 2025-01-08 RX ADMIN — POLYETHYLENE GLYCOL 3350 17 G: 17 POWDER, FOR SOLUTION ORAL at 08:01

## 2025-01-08 RX ADMIN — METOPROLOL TARTRATE 12.5 MG: 25 TABLET, FILM COATED ORAL at 08:01

## 2025-01-08 RX ADMIN — METHOCARBAMOL 500 MG: 500 TABLET ORAL at 04:01

## 2025-01-08 RX ADMIN — LEVOTHYROXINE SODIUM 175 MCG: 150 TABLET ORAL at 08:01

## 2025-01-08 RX ADMIN — FUROSEMIDE 40 MG: 10 INJECTION, SOLUTION INTRAVENOUS at 09:01

## 2025-01-08 RX ADMIN — ACETAMINOPHEN 650 MG: 325 TABLET ORAL at 08:01

## 2025-01-08 NOTE — PLAN OF CARE
PT Evaluation completed and PT POC established.    Problem: Physical Therapy  Goal: Physical Therapy Goal  Description: Goals to be met by: 2025     Patient will increase functional independence with mobility by performin. Supine to sit with Modified [place]  2. Sit to supine with Modified [place]  3. Sit to stand transfer with Supervision  4. Bed to chair transfer with Supervision using LRAD  5. Gait  x 100 feet with Supervision using LRAD.   6. Ascend/descend 4 stair with left Handrails Stand-by Assistance using LRAD.     Outcome: Progressing

## 2025-01-08 NOTE — ANESTHESIA POSTPROCEDURE EVALUATION
Anesthesia Post Evaluation    Patient: Felicity Culp    Procedure(s) Performed: Procedure(s) (LRB):  XI ROBOTIC WEDGE RESECTION, LUNG (RATS) (Right)  BRONCHOSCOPY (N/A)  XI ROBOTIC RATS, WITH LYMPHADENECTOMY (Right)  BLOCK, NERVE, INTERCOSTAL, 2 OR MORE    Final Anesthesia Type: general      Patient location during evaluation: PACU  Patient participation: Yes- Able to Participate  Level of consciousness: awake and alert and oriented  Post-procedure vital signs: reviewed and stable  Pain management: adequate  Airway patency: patent  SALIMA mitigation strategies: Intraoperative administration of CPAP, nasopharyngeal airway, or oral appliance during sedation, Multimodal analgesia, Extubation while patient is awake, Verification of full reversal of neuromuscular block and Extubation and recovery carried out in lateral, semiupright, or other nonsupine position  PONV status at discharge: No PONV  Anesthetic complications: no      Cardiovascular status: hemodynamically stable  Respiratory status: unassisted  Hydration status: euvolemic  Follow-up not needed.              Vitals Value Taken Time   /64 01/08/25 0705   Temp 36.8 °C (98.2 °F) 01/08/25 0705   Pulse 80 01/08/25 0800   Resp 20 01/08/25 0828   SpO2 95 % 01/08/25 0800         Event Time   Out of Recovery 12:45:00         Pain/Contreras Score: Pain Rating Prior to Med Admin: 8 (1/8/2025  8:28 AM)  Pain Rating Post Med Admin: 2 (1/8/2025  5:59 AM)  Contreras Score: 10 (1/7/2025  4:36 PM)

## 2025-01-08 NOTE — SUBJECTIVE & OBJECTIVE
Interval History: Ms. Culp reports she is feeling well today. VSS. Afebrile. Chest tube with 270 cc out, SS. No air leak.     Medications:  Continuous Infusions:  Scheduled Meds:   acetaminophen  650 mg Oral Q8H    albuterol-ipratropium  3 mL Nebulization Q4H WAKE    atorvastatin  20 mg Oral QHS    enoxparin  40 mg Subcutaneous Daily    gabapentin  300 mg Oral QHS    levothyroxine  175 mcg Oral Daily    methocarbamoL  500 mg Oral QID    metoprolol tartrate  12.5 mg Oral BID    polyethylene glycol  17 g Oral Daily    senna-docusate 8.6-50 mg  1 tablet Oral BID     PRN Meds:  Current Facility-Administered Medications:     bisacodyL, 10 mg, Rectal, Daily PRN    ondansetron, 8 mg, Oral, Q8H PRN    oxyCODONE, 5 mg, Oral, Q4H PRN    oxyCODONE, 10 mg, Oral, Q4H PRN     Review of patient's allergies indicates:  No Known Allergies  Objective:     Vital Signs (Most Recent):  Temp: 98.2 °F (36.8 °C) (01/08/25 0705)  Pulse: 88 (01/08/25 1051)  Resp: 20 (01/08/25 0828)  BP: 136/64 (01/08/25 0705)  SpO2: 95 % (01/08/25 0800) Vital Signs (24h Range):  Temp:  [97.5 °F (36.4 °C)-98.7 °F (37.1 °C)] 98.2 °F (36.8 °C)  Pulse:  [69-94] 88  Resp:  [15-28] 20  SpO2:  [88 %-100 %] 95 %  BP: (115-169)/(54-83) 136/64     Intake/Output - Last 3 Shifts         01/06 0700 01/07 0659 01/07 0700 01/08 0659 01/08 0700 01/09 0659    IV Piggyback  1900     Total Intake(mL/kg)  1900 (14.6)     Urine (mL/kg/hr)  200 (0.1)     Stool  0     Chest Tube  270     Total Output  470     Net  +1430            Stool Occurrence  0 x             SpO2: 95 %        Physical Exam  Vitals reviewed.   Constitutional:       General: She is not in acute distress.  HENT:      Head: Normocephalic.   Eyes:      General: No scleral icterus.     Extraocular Movements: Extraocular movements intact.   Cardiovascular:      Rate and Rhythm: Normal rate.   Pulmonary:      Effort: Pulmonary effort is normal. No respiratory distress.      Comments: CT with SS output. No air  leak.   Abdominal:      General: There is no distension.      Palpations: Abdomen is soft.   Skin:     General: Skin is warm and dry.   Neurological:      General: No focal deficit present.      Mental Status: She is alert and oriented to person, place, and time.            Significant Labs:  All pertinent labs from the last 24 hours have been reviewed.    Significant Diagnostics:  I have reviewed and interpreted all pertinent imaging results/findings within the past 24 hours.    VTE Risk Mitigation (From admission, onward)           Ordered     enoxaparin injection 40 mg  Daily         01/07/25 1153     IP VTE HIGH RISK PATIENT  Once         01/07/25 1153     Place PHILOMENA hose  Until discontinued         01/07/25 1153     Place sequential compression device  Until discontinued         01/07/25 1153

## 2025-01-08 NOTE — PT/OT/SLP EVAL
"Physical Therapy Co-Evaluation    Patient Name:  Felicity Culp   MRN:  [id number]    Recommendations:     Discharge Recommendations: No Therapy Indicated   Discharge Equipment Recommendations: none   Barriers to discharge: Inaccessible home    Assessment:     Felicity Culp is a 69 y.o. female admitted with a medical diagnosis of Carcinoid tumor of right lung.  She presents with the following impairments/functional limitations: weakness, impaired endurance, gait instability, impaired balance, impaired functional mobility.    Pleasant and motivated. Pt performed all mobility with 1 person for safety and no LOB noted. Pt chest tube connected to wall suction and to remain connected pending imaging result per RN; thus limited amb within room. Pt has 4 FLORENCIO L HR to front entrance of home; sister available to assist. Pt will benefit from skilled PT services while in house in order to address the aforementioned deficits.      Rehab Prognosis: Good; patient would benefit from acute skilled PT services to address these deficits and reach maximum level of function.    Recent Surgery: Procedure(s) (LRB):  XI ROBOTIC WEDGE RESECTION, LUNG (RATS) (Right)  BRONCHOSCOPY (N/A)  XI ROBOTIC RATS, WITH LYMPHADENECTOMY (Right)  BLOCK, NERVE, INTERCOSTAL, 2 OR MORE 1 Day Post-Op    Plan:     During this hospitalization, patient to be seen 3 x/week to address the identified rehab impairments via gait training, therapeutic activities, therapeutic exercises, neuromuscular re-education and progress toward the following goals:    Plan of Care Expires:  02/08/25    Subjective     "I walked to the bathroom earlier and carried that"    Pain/Comfort:  Pain Rating 1: 0/10    Patients cultural, spiritual, Yazdanism conflicts given the current situation: no    Living Environment:  Per pt, pt resides alone in [facility] with 4 FLORENCIO L HR. Pt has tub/shower combo.  Prior to admission, patients level of function was I HH distances, mod I SPC long distances.  " Equipment used at home: bath bench, cane, straight.  DME owned (not currently used): transfer tub bench.  Upon discharge, patient will have assistance from sister.    Objective:     Communicated with RN prior to session.  Patient found HOB elevated with chest tube, telemetry  upon PT entry to room.    General Precautions: Standard, fall  Orthopedic Precautions:N/A   Braces: N/A  Respiratory Status: Room air, breathing treatment upon entry    Exams:  RLE ROM: WFL  RLE Strength: WFL  LLE ROM: WFL  LLE Strength: WFL    Functional Mobility:  Bed Mobility:     Scooting: supervision  Supine to Sit: supervision  Transfers:     Sit to Stand:  supervision with no AD  Bed > Chair > BSC > Chair: stand by assistance with  no AD  using  Step Transfer  Gait: pt amb 4 steps x 3 bouts no AD SBA with transfers in between bouts. Pt presented with slight trunk flexion, B shortened steps, fair steadiness  Balance:   Good sitting balance  Fair-good standing balance      AM-PAC 6 CLICK MOBILITY  Total Score:19       Treatment & Education:  Pericare supvn  Discussed sitting upright in chair >1hr, pt agreeable  Discussed sitting up EOB and/or UIC with RN/staff outside of therapy services    Educated pt on PT role/POC  Educated pt on importance of OOB activity and daily ambulation   Pt educated on proper body mechanics, safety techniques, and energy conservation with PT facilitation and cueing throughout session   Pt verbalized understanding        Patient left up in chair with all lines intact, call button in reach, RN notified, and OT present.    GOALS:   Multidisciplinary Problems       Physical Therapy Goals          Problem: Physical Therapy    Goal Priority Disciplines Outcome Interventions   Physical Therapy Goal     PT, PT/OT Progressing    Description: Goals to be met by: 2025     Patient will increase functional independence with mobility by performin. Supine to sit with Modified [place]  2. Sit to supine with  Modified [place]  3. Sit to stand transfer with Supervision  4. Bed to chair transfer with Supervision using LRAD  5. Gait  x 100 feet with Supervision using LRAD.   6. Ascend/descend 4 stair with left Handrails Stand-by Assistance using LRAD.                          History:     Past Medical History:   Diagnosis Date    Hypertension     Sleep apnea     Thyroid disease        Past Surgical History:   Procedure Laterality Date    APPENDECTOMY  2005    BRONCHOSCOPY N/A 1/7/2025    Procedure: BRONCHOSCOPY;  Surgeon: Ke Thompson MD;  Location: [facility] OR [address] [place] [address];  Service: Cardiothoracic;  Laterality: N/A;    CARPAL TUNNEL RELEASE  2003    INJECTION OF ANESTHETIC AGENT AROUND MULTIPLE INTERCOSTAL NERVES  1/7/2025    Procedure: BLOCK, NERVE, INTERCOSTAL, 2 OR MORE;  Surgeon: Ke Thompson MD;  Location: [facility] OR [address] [place] [address];  Service: Cardiothoracic;;    SPINAL FUSION  2015    WEDGE RESECTION, LUNG, ROBOT-ASSISTED, USING VATS, USING DA EMMA XI Right 1/7/2025    Procedure: XI ROBOTIC WEDGE RESECTION, LUNG (RATS);  Surgeon: Ke Thompson MD;  Location: [address];  Service: Cardiothoracic;  Laterality: Right;    XI ROBOTIC RATS, WITH LYMPHADENECTOMY Right 1/7/2025    Procedure: XI ROBOTIC RATS, WITH LYMPHADENECTOMY;  Surgeon: Ke Thompson MD;  Location: [address];  Service: Cardiothoracic;  Laterality: Right;       Time Tracking:     PT Received On: 01/08/25  PT Start Time: 0957     PT Stop Time: 1026  PT Total Time (min): 29 min     Billable Minutes: Evaluation 10 and Neuromuscular Re-education 15    Co-treatment performed due to patient's multiple deficits requiring two skilled therapists to appropriately and safely assess patient's strength and endurance while facilitating functional tasks in addition to accommodating for patient's activity tolerance.         01/08/2025

## 2025-01-08 NOTE — ASSESSMENT & PLAN NOTE
Felicity Culp is a 69 y.o. female who is now s/p en bloc resection of a RML carcinoid tumor on 1/7/24    -[facility]  -Advance diet to regular  -Chest tube to WS  -Metoprolol for A. Fib ppx  -Lasix 40 x 1  -PT  -OOBTC/Ambulate  -IS, scheduled nebs  -CPAP QHS nightly prn    Dispo: continue inpatient care, potential PM dc tomorrow.

## 2025-01-08 NOTE — PROGRESS NOTES
[facility]  Thoracic Surgery  Progress Note    Subjective:     History of Present Illness:    From initial clinic visit 12/6/24: Patient is a 69 y.o. 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  PMHistory of obstructive sleep apnea, obesity, hypertension, (coreg, losartan). S/p Thyroidectomy (~2009) on synthroid. CT chest 9/9/24 with RML noncalcified nodule measuring 1.9 cm. PET with SUV max 4.1 and avid left axillary lymphadenectomy. Percutaneous biopsy returned carcinoid.  Axillary lymph node negative for malignancy.      Patient denies cough, denies hemoptysis. Denies weight loss.      PSH: spinal fusion, appendectomy, carpal tunnel release  Never smoker    Interval History: Ms. Culp states she had the flu shortly after her clinic visit, but has since recovered. She denies any other changes in health history. She denies any recent fevers, chills, cough, shortness of breath or chest pain.         Post-Op Info:  Procedure(s) (LRB):  XI ROBOTIC WEDGE RESECTION, LUNG (RATS) (Right)  BRONCHOSCOPY (N/A)  XI ROBOTIC RATS, WITH LYMPHADENECTOMY (Right)  BLOCK, NERVE, INTERCOSTAL, 2 OR MORE   1 Day Post-Op     Interval History: Ms. Culp reports she is feeling well today. VSS. Afebrile. Chest tube with 270 cc out, SS. No air leak.     Medications:  Continuous Infusions:  Scheduled Meds:   acetaminophen  650 mg Oral Q8H    albuterol-ipratropium  3 mL Nebulization Q4H WAKE    atorvastatin  20 mg Oral QHS    enoxparin  40 mg Subcutaneous Daily    gabapentin  300 mg Oral QHS    levothyroxine  175 mcg Oral Daily    methocarbamoL  500 mg Oral QID    metoprolol tartrate  12.5 mg Oral BID    polyethylene glycol  17 g Oral Daily    senna-docusate 8.6-50 mg  1 tablet Oral BID     PRN Meds:  Current Facility-Administered Medications:     bisacodyL, 10 mg, Rectal, Daily PRN    ondansetron, 8 mg, Oral, Q8H PRN    oxyCODONE, 5 mg, Oral, Q4H PRN    oxyCODONE, 10 mg, Oral, Q4H PRN     Review  of patient's allergies indicates:  No Known Allergies  Objective:     Vital Signs (Most Recent):  Temp: 98.2 °F (36.8 °C) (01/08/25 0705)  Pulse: 88 (01/08/25 1051)  Resp: 20 (01/08/25 0828)  BP: 136/64 (01/08/25 0705)  SpO2: 95 % (01/08/25 0800) Vital Signs (24h Range):  Temp:  [97.5 °F (36.4 °C)-98.7 °F (37.1 °C)] 98.2 °F (36.8 °C)  Pulse:  [69-94] 88  Resp:  [15-28] 20  SpO2:  [88 %-100 %] 95 %  BP: (115-169)/(54-83) 136/64     Intake/Output - Last 3 Shifts         01/06 0700 01/07 0659 01/07 0700 01/08 0659 01/08 0700 01/09 0659    IV Piggyback  1900     Total Intake(mL/kg)  1900 (14.6)     Urine (mL/kg/hr)  200 (0.1)     Stool  0     Chest Tube  270     Total Output  470     Net  +1430            Stool Occurrence  0 x             SpO2: 95 %        Physical Exam  Vitals reviewed.   Constitutional:       General: She is not in acute distress.  HENT:      Head: Normocephalic.   Eyes:      General: No scleral icterus.     Extraocular Movements: Extraocular movements intact.   Cardiovascular:      Rate and Rhythm: Normal rate.   Pulmonary:      Effort: Pulmonary effort is normal. No respiratory distress.      Comments: CT with SS output. No air leak.   Abdominal:      General: There is no distension.      Palpations: Abdomen is soft.   Skin:     General: Skin is warm and dry.   Neurological:      General: No focal deficit present.      Mental Status: She is alert and oriented to person, place, and time.            Significant Labs:  All pertinent labs from the last 24 hours have been reviewed.    Significant Diagnostics:  I have reviewed and interpreted all pertinent imaging results/findings within the past 24 hours.    VTE Risk Mitigation (From admission, onward)           Ordered     enoxaparin injection 40 mg  Daily         01/07/25 1153     IP VTE HIGH RISK PATIENT  Once         01/07/25 1153     Place PHILOMENA hose  Until discontinued         01/07/25 1153     Place sequential compression device  Until  discontinued         01/07/25 1063                  Assessment/Plan:     * Carcinoid tumor of right lung  Felicity Culp is a 69 y.o. female who is now s/p en bloc resection of a RML carcinoid tumor on 1/7/24    -[facility]  -Advance diet to regular  -Chest tube to WS  -Metoprolol for A. Fib ppx  -Lasix 40 x 1  -PT  -OOBTC/Ambulate  -IS, scheduled nebs  -CPAP QHS nightly prn    Dispo: continue inpatient care, potential PM dc tomorrow.        Smith Beltran MD  Thoracic Surgery  [facility]

## 2025-01-08 NOTE — PLAN OF CARE
[facility]  Initial Discharge Assessment       Primary Care Provider: Katherine Baltazar MD    Admission Diagnosis: Carcinoid tumor of lung, unspecified whether malignant [D3A.090]  Malignant neoplasm of right lung [C34.91]    Admission Date: 1/7/2025  Expected Discharge Date: 1/10/2025    Transition of Care Barriers: None    Payor: HUMA[employer] MANAGED MEDICARE / Plan: HU[employer]NA MEDICARE PPO / Product Type: Medicare Advantage /     Extended Emergency Contact Information  Primary Emergency Contact: [place] [address] [facility]  Home Phone: [phone]  Mobile Phone: [phone]  Relation: Relative  Secondary Emergency Contact: [place] [address] [facility]  Home Phone: [phone]  Relation: Sister    Discharge Plan A: Home  Discharge Plan B: Home with family      [id number] [place], [place] - [address] AT [facility]  & [address]  [address]  [facility] [id number]  Phone: [phone] Fax: [phone]      Initial Assessment (most recent)       Adult Discharge Assessment - 01/08/25 1402          Discharge Assessment    Assessment Type Discharge Planning Assessment     Confirmed/corrected address, phone number and insurance Yes     Confirmed Demographics Correct on Facesheet     Source of Information patient     Does patient/caregiver understand observation status Yes     Reason For Admission Neoplasm of Right Lung     People in Home alone     Facility Arrived From: Home     Do you expect to return to your current living situation? Yes     Do you have help at home or someone to help you manage your care at home? Yes     Who are your caregiver(s) and their phone number(s)? Nicheli Culp /sister Trisha also assists     Prior to hospitilization cognitive status: Alert/Oriented;No Deficits     Current cognitive status: Alert/Oriented;No Deficits     Walking or Climbing Stairs Difficulty  no     Dressing/Bathing Difficulty no     Home Accessibility wheelchair accessible     Home Layout Able to live on 1st floor     Equipment Currently Used at Home CPAP     Readmission within 30 days? No     Patient currently being followed by outpatient case management? No     Do you currently have service(s) that help you manage your care at home? No     Do you take prescription medications? Yes     Do you have prescription coverage? Yes     Do you have any problems affording any of your prescribed medications? No     Is the patient taking medications as prescribed? yes     Who is going to help you get home at discharge? Niece/Sister     How do you get to doctors appointments? car, drives self     Are you on dialysis? No     Do you take coumadin? No     Discharge Plan A Home     Discharge Plan B Home with family     DME Needed Upon Discharge  none     Discharge Plan discussed with: Patient     Transition of Care Barriers None        Physical Activity    On average, how many days per week do you engage in moderate to strenuous exercise (like a brisk walk)? 0 days     On average, how many minutes do you engage in exercise at this level? 0 min        Financial Resource Strain    How hard is it for you to pay for the very basics like food, housing, medical care, and heating? Not very hard        Housing Stability    In the last 12 months, was there a time when you were not able to pay the mortgage or rent on time? No     At any time in the past 12 months, were you homeless or living in a shelter (including now)? No        Transportation Needs    Has the lack of transportation kept you from medical appointments, meetings, work or from getting things needed for daily living? No        Food Insecurity    Within the past 12 months, you worried that your food would run out before you got the money to buy more. Never true     Within the past 12 months, the food you bought just didn't last and you didn't have money to get  more. Never true        Stress    Do you feel stress - tense, restless, nervous, or anxious, or unable to sleep at night because your mind is troubled all the time - these days? Only a little        Social Isolation    How often do you feel lonely or isolated from those around you?  Never        Alcohol Use    Q1: How often do you have a drink containing alcohol? Never     Q2: How many drinks containing alcohol do you have on a typical day when you are drinking? Patient does not drink     Q3: How often do you have six or more drinks on one occasion? Never        Utilities    In the past 12 months has the electric, gas, oil, or water company threatened to shut off services in your home? No        Health Literacy    How often do you need to have someone help you when you read instructions, pamphlets, or other written material from your doctor or pharmacy? Never                   Discharge Plan A and Plan B have been determined by review of patient's clinical status, future medical and therapeutic needs, and coverage/benefits for post-acute care in coordination with multidisciplinary team members.

## 2025-01-08 NOTE — PT/OT/SLP EVAL
Occupational Therapy   Evaluation and Discharge Note    Name: Felicity Culp  MRN: [id number]  Admitting Diagnosis: Carcinoid tumor of right lung  Recent Surgery: Procedure(s) (LRB):  XI ROBOTIC WEDGE RESECTION, LUNG (RATS) (Right)  BRONCHOSCOPY (N/A)  XI ROBOTIC RATS, WITH LYMPHADENECTOMY (Right)  BLOCK, NERVE, INTERCOSTAL, 2 OR MORE 1 Day Post-Op    Recommendations:     Discharge Recommendations: No Therapy Indicated  Discharge Equipment Recommendations: none  Barriers to discharge:  None    Assessment:   CO-TX with PT for pt safety and max participation with both disciplines.     Felicity Culp is a 69 y.o. female with a medical diagnosis of Carcinoid tumor of right lung. At this time, patient is functioning at their prior level of function and does not require further acute OT services.     Plan:     During this hospitalization, patient does not require further acute OT services.  Please re-consult if situation changes.    Plan of Care Reviewed with: patient    Subjective     Chief Complaint: None stated  Patient/Family Comments/goals: return home    Occupational Profile:  Living Environment: pt resides alone in [facility] with 4 FLORENC[address] L HR. Pt has tub/shower combo.   Previous level of function: Indp  Equipment Used at home: CPAP  Assistance upon Discharge: sister    Pain/Comfort:  Pain Rating 1: 0/10    Patients cultural, spiritual, Buddhism conflicts given the current situation: no    Objective:     Communicated with: Nsg prior to session.  Patient found performing breathing treatment c/ HOB elevated with chest tube, telemetry upon OT entry to room.    General Precautions: Standard, fall  Orthopedic Precautions: N/A  Braces: N/A  Respiratory Status: Room air     Occupational Performance:    Bed Mobility:    Patient completed Scooting/Bridging with supervision  Patient completed Supine to Sit with supervision    Functional Mobility/Transfers:  Patient completed Sit <> Stand Transfer with supervision  with  no assistive  device   Patient completed Bed > Chair > BSC > Chair Transfer using  technique with stand by assistance with no assistive device  Functional Mobility: Unable to ambulate further distances 2/2 chest tube setup to wall suction    Activities of Daily Living:  Grooming: supervision seated oral care  Upper Body Dressing: stand by assistance don gown for backside  Toileting: supervision and stand by assistance      Cognitive/Visual Perceptual:  A&O x4    Physical Exam:  BUE WNL  Balance: Pt able to maintain good balance at sink based on length of time standing EOB    AMPAC 6 Click ADL:  AMPAC Total Score: 23    Treatment & Education:  Pt educated on role and purpose of therapy  Pt educated on goal setting  Pt educated on benefits of OOB activity  Pt educated on self advocacy      Patient left up in chair with all lines intact, call button in reach, and nsg notified    GOALS:   Multidisciplinary Problems       Occupational Therapy Goals       Not on file              Multidisciplinary Problems (Resolved)          Problem: Occupational Therapy    Goal Priority Disciplines Outcome Interventions   Occupational Therapy Goal   (Resolved)     OT, PT/OT Met                        History:     Past Medical History:   Diagnosis Date    Hypertension     Sleep apnea     Thyroid disease          Past Surgical History:   Procedure Laterality Date    APPENDECTOMY  2005    BRONCHOSCOPY N/A 1/7/2025    Procedure: BRONCHOSCOPY;  Surgeon: Ke Thompson MD;  Location: [facility] OR [address] [place] [address];  Service: Cardiothoracic;  Laterality: N/A;    CARPAL TUNNEL RELEASE  2003    INJECTION OF ANESTHETIC AGENT AROUND MULTIPLE INTERCOSTAL NERVES  1/7/2025    Procedure: BLOCK, NERVE, INTERCOSTAL, 2 OR MORE;  Surgeon: Ke Thompson MD;  Location: [facility] OR [address] [place] [address];  Service: Cardiothoracic;;    SPINAL FUSION  2015    WEDGE RESECTION, LUNG, ROBOT-ASSISTED, USING VATS, USING DA EMMA XI Right 1/7/2025    Procedure: XI ROBOTIC WEDGE RESECTION, LUNG (RATS);   Surgeon: Ke Thompson MD;  Location: [address];  Service: Cardiothoracic;  Laterality: Right;    XI ROBOTIC RATS, WITH LYMPHADENECTOMY Right 1/7/2025    Procedure: XI ROBOTIC RATS, WITH LYMPHADENECTOMY;  Surgeon: Ke Thompson MD;  Location: [facility] OR [address] [place] [address];  Service: Cardiothoracic;  Laterality: Right;       Time Tracking:     OT Date of Treatment: 01/08/25  OT Start Time: 0956  OT Stop Time: 1025  OT Total Time (min): 29 min    Billable Minutes:Evaluation 15  Self Care/Home Management 14    1/8/2025

## 2025-01-09 ENCOUNTER — TELEPHONE (OUTPATIENT)
Dept: CARDIOTHORACIC SURGERY | Facility: CLINIC | Age: 70
End: 2025-01-09
Payer: MEDICARE

## 2025-01-09 VITALS
WEIGHT: 287.69 LBS | BODY MASS INDEX: 47.93 KG/M2 | OXYGEN SATURATION: 96 % | SYSTOLIC BLOOD PRESSURE: 149 MMHG | HEIGHT: 65 IN | TEMPERATURE: 98 F | DIASTOLIC BLOOD PRESSURE: 70 MMHG | HEART RATE: 91 BPM | RESPIRATION RATE: 17 BRPM

## 2025-01-09 DIAGNOSIS — D3A.090 CARCINOID TUMOR OF LUNG, UNSPECIFIED WHETHER MALIGNANT: Primary | ICD-10-CM

## 2025-01-09 LAB
ANION GAP SERPL CALC-SCNC: 7 MMOL/L (ref 8–16)
BUN SERPL-MCNC: 15 MG/DL (ref 8–23)
CALCIUM SERPL-MCNC: 8.9 MG/DL (ref 8.7–10.5)
CHLORIDE SERPL-SCNC: 106 MMOL/L (ref 95–110)
CO2 SERPL-SCNC: 23 MMOL/L (ref 23–29)
CREAT SERPL-MCNC: 0.8 MG/DL (ref 0.5–1.4)
EST. GFR  (NO RACE VARIABLE): >60 ML/MIN/1.73 M^2
GLUCOSE SERPL-MCNC: 137 MG/DL (ref 70–110)
POTASSIUM SERPL-SCNC: 4 MMOL/L (ref 3.5–5.1)
SODIUM SERPL-SCNC: 136 MMOL/L (ref 136–145)

## 2025-01-09 PROCEDURE — 25000242 PHARM REV CODE 250 ALT 637 W/ HCPCS

## 2025-01-09 PROCEDURE — 63600175 PHARM REV CODE 636 W HCPCS: Performed by: STUDENT IN AN ORGANIZED HEALTH CARE EDUCATION/TRAINING PROGRAM

## 2025-01-09 PROCEDURE — 25000003 PHARM REV CODE 250: Performed by: STUDENT IN AN ORGANIZED HEALTH CARE EDUCATION/TRAINING PROGRAM

## 2025-01-09 PROCEDURE — 36415 COLL VENOUS BLD VENIPUNCTURE: CPT

## 2025-01-09 PROCEDURE — 94761 N-INVAS EAR/PLS OXIMETRY MLT: CPT

## 2025-01-09 PROCEDURE — 97116 GAIT TRAINING THERAPY: CPT | Mod: CQ

## 2025-01-09 PROCEDURE — 63600175 PHARM REV CODE 636 W HCPCS

## 2025-01-09 PROCEDURE — 1111F DSCHRG MED/CURRENT MED MERGE: CPT | Mod: CPTII,,, | Performed by: STUDENT IN AN ORGANIZED HEALTH CARE EDUCATION/TRAINING PROGRAM

## 2025-01-09 PROCEDURE — 94640 AIRWAY INHALATION TREATMENT: CPT

## 2025-01-09 PROCEDURE — 80048 BASIC METABOLIC PNL TOTAL CA: CPT

## 2025-01-09 PROCEDURE — 99024 POSTOP FOLLOW-UP VISIT: CPT | Mod: ,,, | Performed by: STUDENT IN AN ORGANIZED HEALTH CARE EDUCATION/TRAINING PROGRAM

## 2025-01-09 PROCEDURE — 99900035 HC TECH TIME PER 15 MIN (STAT)

## 2025-01-09 PROCEDURE — 25000003 PHARM REV CODE 250

## 2025-01-09 RX ORDER — POLYETHYLENE GLYCOL 3350 17 G/17G
17 POWDER, FOR SOLUTION ORAL 2 TIMES DAILY PRN
COMMUNITY
Start: 2025-01-09 | End: 2025-01-09

## 2025-01-09 RX ORDER — GABAPENTIN 300 MG/1
300 CAPSULE ORAL NIGHTLY
Qty: 14 CAPSULE | Refills: 0 | Status: SHIPPED | OUTPATIENT
Start: 2025-01-09 | End: 2025-01-23

## 2025-01-09 RX ORDER — GABAPENTIN 300 MG/1
300 CAPSULE ORAL NIGHTLY
Qty: 14 CAPSULE | Refills: 0 | Status: SHIPPED | OUTPATIENT
Start: 2025-01-09 | End: 2025-01-09

## 2025-01-09 RX ORDER — FUROSEMIDE 10 MG/ML
40 INJECTION INTRAMUSCULAR; INTRAVENOUS ONCE
Status: COMPLETED | OUTPATIENT
Start: 2025-01-09 | End: 2025-01-09

## 2025-01-09 RX ORDER — ACETAMINOPHEN 325 MG/1
650 TABLET ORAL EVERY 8 HOURS
COMMUNITY
Start: 2025-01-09 | End: 2025-01-16

## 2025-01-09 RX ORDER — OXYCODONE HYDROCHLORIDE 10 MG/1
10 TABLET ORAL EVERY 4 HOURS PRN
Qty: 20 TABLET | Refills: 0 | Status: SHIPPED | OUTPATIENT
Start: 2025-01-09

## 2025-01-09 RX ORDER — POTASSIUM CHLORIDE 20 MEQ/1
40 TABLET, EXTENDED RELEASE ORAL DAILY
Qty: 8 TABLET | Refills: 0 | Status: SHIPPED | OUTPATIENT
Start: 2025-01-09 | End: 2025-01-09

## 2025-01-09 RX ORDER — FUROSEMIDE 20 MG/1
40 TABLET ORAL DAILY
Qty: 8 TABLET | Refills: 0 | Status: SHIPPED | OUTPATIENT
Start: 2025-01-09 | End: 2025-01-09

## 2025-01-09 RX ORDER — POTASSIUM CHLORIDE 20 MEQ/1
20 TABLET, EXTENDED RELEASE ORAL ONCE
Status: COMPLETED | OUTPATIENT
Start: 2025-01-09 | End: 2025-01-09

## 2025-01-09 RX ORDER — METHOCARBAMOL 500 MG/1
500 TABLET, FILM COATED ORAL 4 TIMES DAILY
Qty: 40 TABLET | Refills: 0 | Status: SHIPPED | OUTPATIENT
Start: 2025-01-09 | End: 2025-01-09

## 2025-01-09 RX ORDER — ACETAMINOPHEN 325 MG/1
650 TABLET ORAL EVERY 8 HOURS
COMMUNITY
Start: 2025-01-09 | End: 2025-01-09

## 2025-01-09 RX ORDER — POLYETHYLENE GLYCOL 3350 17 G/17G
17 POWDER, FOR SOLUTION ORAL 2 TIMES DAILY PRN
COMMUNITY
Start: 2025-01-09

## 2025-01-09 RX ORDER — AMOXICILLIN 250 MG
1 CAPSULE ORAL 2 TIMES DAILY
Qty: 28 TABLET | Refills: 0 | Status: SHIPPED | OUTPATIENT
Start: 2025-01-09 | End: 2025-01-09

## 2025-01-09 RX ORDER — POTASSIUM CHLORIDE 20 MEQ/1
40 TABLET, EXTENDED RELEASE ORAL DAILY
Qty: 8 TABLET | Refills: 0 | Status: SHIPPED | OUTPATIENT
Start: 2025-01-09 | End: 2025-01-13

## 2025-01-09 RX ORDER — OXYCODONE HYDROCHLORIDE 10 MG/1
10 TABLET ORAL EVERY 4 HOURS PRN
Qty: 20 TABLET | Refills: 0 | Status: SHIPPED | OUTPATIENT
Start: 2025-01-09 | End: 2025-01-09

## 2025-01-09 RX ORDER — IPRATROPIUM BROMIDE AND ALBUTEROL SULFATE 2.5; .5 MG/3ML; MG/3ML
3 SOLUTION RESPIRATORY (INHALATION) EVERY 4 HOURS PRN
Status: DISCONTINUED | OUTPATIENT
Start: 2025-01-09 | End: 2025-01-09 | Stop reason: HOSPADM

## 2025-01-09 RX ORDER — FUROSEMIDE 20 MG/1
40 TABLET ORAL DAILY
Qty: 8 TABLET | Refills: 0 | Status: SHIPPED | OUTPATIENT
Start: 2025-01-09 | End: 2025-01-13

## 2025-01-09 RX ORDER — METHOCARBAMOL 500 MG/1
500 TABLET, FILM COATED ORAL 4 TIMES DAILY
Qty: 40 TABLET | Refills: 0 | Status: SHIPPED | OUTPATIENT
Start: 2025-01-09 | End: 2025-01-19

## 2025-01-09 RX ORDER — AMOXICILLIN 250 MG
1 CAPSULE ORAL 2 TIMES DAILY
Qty: 28 TABLET | Refills: 0 | Status: SHIPPED | OUTPATIENT
Start: 2025-01-09 | End: 2025-01-23

## 2025-01-09 RX ADMIN — IPRATROPIUM BROMIDE AND ALBUTEROL SULFATE 3 ML: 2.5; .5 SOLUTION RESPIRATORY (INHALATION) at 11:01

## 2025-01-09 RX ADMIN — ENOXAPARIN SODIUM 40 MG: 40 INJECTION SUBCUTANEOUS at 08:01

## 2025-01-09 RX ADMIN — METOPROLOL TARTRATE 12.5 MG: 25 TABLET, FILM COATED ORAL at 08:01

## 2025-01-09 RX ADMIN — LEVOTHYROXINE SODIUM 175 MCG: 150 TABLET ORAL at 08:01

## 2025-01-09 RX ADMIN — POLYETHYLENE GLYCOL 3350 17 G: 17 POWDER, FOR SOLUTION ORAL at 08:01

## 2025-01-09 RX ADMIN — FUROSEMIDE 40 MG: 10 INJECTION, SOLUTION INTRAVENOUS at 08:01

## 2025-01-09 RX ADMIN — METHOCARBAMOL 500 MG: 500 TABLET ORAL at 08:01

## 2025-01-09 RX ADMIN — OXYCODONE HYDROCHLORIDE 10 MG: 10 TABLET ORAL at 08:01

## 2025-01-09 RX ADMIN — SENNOSIDES AND DOCUSATE SODIUM 1 TABLET: 50; 8.6 TABLET ORAL at 08:01

## 2025-01-09 RX ADMIN — ACETAMINOPHEN 650 MG: 325 TABLET ORAL at 05:01

## 2025-01-09 RX ADMIN — OXYCODONE HYDROCHLORIDE 10 MG: 10 TABLET ORAL at 02:01

## 2025-01-09 RX ADMIN — POTASSIUM CHLORIDE 20 MEQ: 1500 TABLET, EXTENDED RELEASE ORAL at 08:01

## 2025-01-09 RX ADMIN — IPRATROPIUM BROMIDE AND ALBUTEROL SULFATE 3 ML: 2.5; .5 SOLUTION RESPIRATORY (INHALATION) at 07:01

## 2025-01-09 NOTE — PT/OT/SLP PROGRESS
"Physical Therapy Treatment    Patient Name:  Felicity Culp   MRN:  [id number]    Recommendations:     Discharge Recommendations: No Therapy Indicated  Discharge Equipment Recommendations: none  Barriers to discharge: None    Assessment:     Felicity Culp is a 69 y.o. female admitted with a medical diagnosis of Carcinoid tumor of right lung.  She presents with the following impairments/functional limitations: weakness, impaired endurance, impaired self care skills, impaired functional mobility, gait instability, impaired balance, impaired cardiopulmonary response to activity. Pt agreeable for therapy, up in chair prior to treat, increased gait distance today, CGA for standing and gait, mild SOB after activity, "not so bad" in response to RPE inquiry    Rehab Prognosis: Good; patient would benefit from acute skilled PT services to address these deficits and reach maximum level of function.    Recent Surgery: Procedure(s) (LRB):  XI ROBOTIC WEDGE RESECTION, LUNG (RATS) (Right)  BRONCHOSCOPY (N/A)  XI ROBOTIC RATS, WITH LYMPHADENECTOMY (Right)  BLOCK, NERVE, INTERCOSTAL, 2 OR MORE 2 Days Post-Op    Plan:     During this hospitalization, patient to be seen 3 x/week to address the identified rehab impairments via gait training, therapeutic activities, therapeutic exercises, neuromuscular re-education and progress toward the following goals:    Plan of Care Expires:  02/08/25    Subjective     Chief Complaint: no complaints  Patient/Family Comments/goals: "I'm feeling much better today"  Pain/Comfort:  Pain Rating 1: 0/10  Pain Rating Post-Intervention 1: 0/10      Objective:     Communicated with RN prior to session.  Patient found up in chair with telemetry (chest tubes disconnected) upon PT entry to room.     General Precautions: Standard, fall  Orthopedic Precautions: N/A  Braces: N/A  Respiratory Status: Room air     Functional Mobility:    Transfers:   Sit <> Stand Transfer: contact guard assistance from bedside chair " using rolling walker     Balance:   Sitting balance: GOOD: Maintains balance through MODERATE excursions of active trunk movement  Standing balance:   FAIR: Maintains without assist but unable to take challenges  FAIR: Needs CONTACT GUARD during gait                 Gait:  Distance: 50' x 2   Assistive Device: RW  Assistance Level: contact guard assistance  Gait Assessment: slow magda, VC's for posture and pursed lipped breathing, difficulty speaking and ambulating, mild SOB noted after activity             AM-PAC 6 CLICK MOBILITY  Turning over in bed (including adjusting bedclothes, sheets and blankets)?: 4  Sitting down on and standing up from a chair with arms (e.g., wheelchair, bedside commode, etc.): 3  Moving from lying on back to sitting on the side of the bed?: 3  Moving to and from a bed to a chair (including a wheelchair)?: 3  Need to walk in hospital room?: 3  Climbing 3-5 steps with a railing?: 3  Basic Mobility Total Score: 19       Treatment & Education:  Education:  PT role and PoC to increase strength and endurance    Patient left up in chair with all lines intact, call button in reach, and RN notified..    GOALS:   Multidisciplinary Problems       Physical Therapy Goals          Problem: Physical Therapy    Goal Priority Disciplines Outcome Interventions   Physical Therapy Goal     PT, PT/OT Progressing    Description: Goals to be met by: 2025     Patient will increase functional independence with mobility by performin. Supine to sit with Modified [place]  2. Sit to supine with Modified [place]  3. Sit to stand transfer with Supervision  4. Bed to chair transfer with Supervision using LRAD  5. Gait  x 100 feet with Supervision using LRAD.   6. Ascend/descend 4 stair with left Handrails Stand-by Assistance using LRAD.                          Time Tracking:     PT Received On: 25  PT Start Time: 1044     PT Stop Time: 1055  PT Total Time (min): 11 min     Billable  Minutes: Gait Training 11min    Treatment Type: Treatment  PT/PTA: PTA     Number of PTA visits since last PT visit: 1 01/09/2025

## 2025-01-09 NOTE — HOSPITAL COURSE
On 1/7/25, Felicity Culp was taken to the operating room and uneventfully underwent the above listed procedure.  Patient was admitted post-operatively for chest tube management and pain control. Overall, brief hospital course was uncomplicated.  On POD 2, CXR and chest tube output were appropriate for tube removal.  At the time of discharge, patient demonstrated tolerance of a regular diet, ability to ambulate without assistance, appropriate use of an incentive spirometer as instructed, and pain was adequately controlled with a multimodal analgesic regimen.  Patient was cleared for discharge home on POD 2 in good condition.

## 2025-01-09 NOTE — PLAN OF CARE
Problem: Adult Inpatient Plan of Care  Goal: Plan of Care Review  1/9/2025 1246 by Madelyn Villasenor RN  Outcome: Met  1/9/2025 1122 by Madelyn Villasenor RN  Outcome: Progressing  Goal: Patient-Specific Goal (Individualized)  1/9/2025 1246 by Madelyn Villasenor RN  Outcome: Met  1/9/2025 1122 by Madelyn Villasenor RN  Outcome: Progressing  Goal: Absence of Hospital-Acquired Illness or Injury  1/9/2025 1246 by Madelyn Villasenor RN  Outcome: Met  1/9/2025 1122 by Madelyn Villasenor RN  Outcome: Progressing  Goal: Optimal Comfort and Wellbeing  1/9/2025 1246 by Madelyn Villasenor RN  Outcome: Met  1/9/2025 1122 by Madelyn Villasenor RN  Outcome: Progressing  Goal: Readiness for Transition of Care  1/9/2025 1246 by Madelyn Villasenor RN  Outcome: Met  1/9/2025 1122 by Madelyn Villasenor RN  Outcome: Progressing     Problem: Bariatric Environmental Safety  Goal: Safety Maintained with Care  1/9/2025 1246 by Madelyn Villasenor RN  Outcome: Met  1/9/2025 1122 by Madelyn Villasenor RN  Outcome: Progressing     Problem: Wound  Goal: Optimal Coping  1/9/2025 1246 by Madelyn Villasenor RN  Outcome: Met  1/9/2025 1122 by Madelyn Villasenor RN  Outcome: Progressing  Goal: Optimal Functional Ability  1/9/2025 1246 by Madelyn Villasenor RN  Outcome: Met  1/9/2025 1122 by Madelyn Villasenor RN  Outcome: Progressing  Goal: Absence of Infection Signs and Symptoms  1/9/2025 1246 by Madelyn Villasenor RN  Outcome: Met  1/9/2025 1122 by Madelyn Villasenor RN  Outcome: Progressing  Goal: Improved Oral Intake  1/9/2025 1246 by Madelyn Villasenor RN  Outcome: Met  1/9/2025 1122 by Madelyn Villasenor RN  Outcome: Progressing  Goal: Optimal Pain Control and Function  1/9/2025 1246 by Madelyn Villasenor, RN  Outcome: Met  1/9/2025 1122 by Madelyn Villasenor, RN  Outcome: Progressing  Goal: Skin Health and Integrity  1/9/2025 1246 by Madelyn Villasenor, RN  Outcome: Met  1/9/2025 1122 by  Madelyn Villasenor, RN  Outcome: Progressing  Goal: Optimal Wound Healing  1/9/2025 1246 by Madelyn Villasenor RN  Outcome: Met  1/9/2025 1122 by Madelyn Villasenor RN  Outcome: Progressing     Problem: Fall Injury Risk  Goal: Absence of Fall and Fall-Related Injury  1/9/2025 1246 by Madelyn Villasenor, RN  Outcome: Met  1/9/2025 1122 by Madelyn Villasenor, RN  Outcome: Progressing

## 2025-01-09 NOTE — PLAN OF CARE
[facility]  Discharge Final Note    Primary Care Provider: Katherine Baltazar MD    Expected Discharge Date: 1/9/2025    Final Discharge Note (most recent)       Final Note - 01/09/25 1240          Final Note    Assessment Type Final Discharge Note     Anticipated Discharge Disposition Home or Self Care     Hospital Resources/Appts/Education Provided Appointments scheduled and added to AVS        Post-Acute Status    Post-Acute Authorization Other     Other Status No Post-Acute Service Needs     Discharge Delays None known at this time                     Important Message from Medicare             Contact Info       Ke Thompson MD   Specialty: Cardiothoracic Surgery    [address]  [place] [place] [place] [facility] [address]   Phone: [phone]       Next Steps: Follow up in 2 week(s)

## 2025-01-09 NOTE — NURSING
"[facility] Plan of Care Note    Dx:   Carcinoid tumor of lung, unspecified whether malignant [D3A.090]  Malignant neoplasm of right lung [C34.91]    Shift Events:     Goals of Care: Chest tube care, pain mgt, safety and comfort.    Neuro: AO x4    Vital Signs: /60   Pulse 86   Temp 99.7 °F (37.6 °C)   Resp 17   Ht 5' 5" (1.651 m)   Wt 130.5 kg (287 lb 11.2 oz)   LMP  (LMP Unknown)   SpO2 95%   Breastfeeding No   BMI 47.88 kg/m²     Respiratory: RA      Diet: Diet Adult Regular      Is patient tolerating current diet? Yes    GTTS: None    Urine Output/Bowel Movement:   No intake/output data recorded.  Last Bowel Movement: 01/06/25      Drains/Tubes/Tube Feeds (include total output/shift):   No intake/output data recorded.      Lines: PIV x3      Accuchecks: None    Skin: Chest Tube Tube - 1 Right Pleural   Wound  Incision Right upper;lateral Chest laparoscopic punctures     Fall Risk Score: 8    Activity level? SBA X1     Any scheduled procedures?  Daily Xray    Any safety concerns? ***    Other: ***   "

## 2025-01-09 NOTE — NURSING
D/C instructions given to pt at bedside.  Verbalizes understanding.  No s/s of distress or c/o pain.  Removed IV x 3, see LDA for documentation.  RA O2.  Dressing to R lateral chest clean, dry and intact.  Pharmacy to fill medications.  Telemetry removed and returned to nursing station.  Tele notified.

## 2025-01-09 NOTE — PROGRESS NOTES
[facility]  Cardiothoracic Surgery  Progress Note    Patient Name: Felicity Culp  MRN: [id number]  Admission Date: 1/7/2025  Hospital Length of Stay: 2 days  Code Status: Full Code   Attending Physician: Ke Thompson MD   Referring Provider: Ke Thompson MD  Principal Problem:Carcinoid tumor of right lung      Subjective:     Post-Op Info:  Procedure(s) (LRB):  XI ROBOTIC WEDGE RESECTION, LUNG (RATS) (Right)  BRONCHOSCOPY (N/A)  XI ROBOTIC RATS, WITH LYMPHADENECTOMY (Right)  BLOCK, NERVE, INTERCOSTAL, 2 OR MORE   2 Days Post-Op     Interval History: NAEO. Good diuresis.  decreased, no air leak.      Medications:  Continuous Infusions:  Scheduled Meds:   acetaminophen  650 mg Oral Q8H    albuterol-ipratropium  3 mL Nebulization Q4H WAKE    atorvastatin  20 mg Oral QHS    enoxparin  40 mg Subcutaneous Q12H (prophylaxis, 0900/2100)    gabapentin  300 mg Oral QHS    levothyroxine  175 mcg Oral Daily    methocarbamoL  500 mg Oral QID    metoprolol tartrate  12.5 mg Oral BID    polyethylene glycol  17 g Oral Daily    senna-docusate 8.6-50 mg  1 tablet Oral BID     PRN Meds:  Current Facility-Administered Medications:     bisacodyL, 10 mg, Rectal, Daily PRN    ondansetron, 8 mg, Oral, Q8H PRN    oxyCODONE, 5 mg, Oral, Q4H PRN    oxyCODONE, 10 mg, Oral, Q4H PRN     Objective:     Vital Signs (Most Recent):  Temp: 98.5 °F (36.9 °C) (01/09/25 0452)  Pulse: 65 (01/09/25 0716)  Resp: 18 (01/09/25 0716)  BP: 129/75 (01/09/25 0452)  SpO2: 95 % (01/09/25 0716) Vital Signs (24h Range):  Temp:  [98.5 °F (36.9 °C)-99.7 °F (37.6 °C)] 98.5 °F (36.9 °C)  Pulse:  [65-97] 65  Resp:  [14-20] 18  SpO2:  [94 %-97 %] 95 %  BP: (110-147)/(51-76) 129/75     Weight: 130.5 kg (287 lb 11.2 oz)  Body mass index is 47.88 kg/m².    SpO2: 95 %       Intake/Output - Last 3 Shifts         01/07 0700  01/08 0659 01/08 0700 01/09 0659 01/09 0700  01/10 0659    P.O.  720     IV Piggyback 1900      Total Intake(mL/kg) 1900 (14.6) 720  (5.5)     Urine (mL/kg/hr) 200 (0.1) 0 (0)     Stool 0      Chest Tube 270 20     Total Output 470 20     Net +1430 +700            Urine Occurrence  6 x     Stool Occurrence 0 x              Lines/Drains/Airways       Drain  Duration                  Chest Tube 01/07/25 1040 Tube - 1 Right Pleural 24 Fr. 1 day              Peripheral Intravenous Line  Duration                  Peripheral IV - Single Lumen 01/07/25 0605 20 G No Anterior;Left;Proximal Forearm 2 days         Peripheral IV - Single Lumen 01/07/25 0733 16 G Right Other 1 day         Peripheral IV - Single Lumen 01/07/25 0733 20 G Right Hand 1 day                     Physical Exam  Vitals reviewed.   Constitutional:       General: She is not in acute distress.  Cardiovascular:      Rate and Rhythm: Normal rate and regular rhythm.   Pulmonary:      Effort: Pulmonary effort is normal. No respiratory distress.      Comments:  SS, no air leak  Skin:     General: Skin is warm and dry.   Neurological:      General: No focal deficit present.      Mental Status: She is alert.            Significant Labs:  All pertinent labs from the last 24 hours have been reviewed.    Significant Diagnostics:  I have reviewed all pertinent imaging results/findings within the past 24 hours.  Assessment/Plan:     * Carcinoid tumor of right lung  Felicity Culp is a 69 y.o. female who is now s/p en bloc resection of a RML carcinoid tumor with RUL involvement on 1/7/24     -[facility]  -Chest tube clamp trial with interval CXR  -Repeat lasix 40 IVP x1 with K  -Metoprolol for A. Fib ppx  -PT  -OOBTC/Ambulate  -IS, scheduled nebs  -CPAP QHS nightly prn  -Potential chest tube removal and DC home following clamp trial        John Gupta, DO  Cardiothoracic Surgery  [facility]

## 2025-01-09 NOTE — DISCHARGE SUMMARY
[facility]  Cardiothoracic Surgery  Discharge Summary      Patient Name: Felicity Culp  MRN: [id number]  Admission Date: 1/7/2025  Hospital Length of Stay: 2 days  Discharge Date and Time:  01/09/2025 12:03 PM  Attending Physician: Ke Thompson MD   Discharging Provider: John Gupta DO  Primary Care Provider: Katherine Baltazar MD    HPI:   From initial clinic visit 12/6/24: Patient is a 69 y.o. 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  PMHistory of obstructive sleep apnea, obesity, hypertension, (coreg, losartan). S/p Thyroidectomy (~2009) on synthroid. CT chest 9/9/24 with RML noncalcified nodule measuring 1.9 cm. PET with SUV max 4.1 and avid left axillary lymphadenectomy. Percutaneous biopsy returned carcinoid.  Axillary lymph node negative for malignancy.      Patient denies cough, denies hemoptysis. Denies weight loss.      PSH: spinal fusion, appendectomy, carpal tunnel release  Never smoker     Interval History: Ms. Culp states she had the flu shortly after her clinic visit, but has since recovered. She denies any other changes in health history. She denies any recent fevers, chills, cough, shortness of breath or chest pain.     Procedure(s) (LRB):  XI ROBOTIC WEDGE RESECTION, LUNG (RATS) (Right)  BRONCHOSCOPY (N/A)  XI ROBOTIC RATS, WITH LYMPHADENECTOMY (Right)  BLOCK, NERVE, INTERCOSTAL, 2 OR MORE      Indwelling Lines/Drains at time of discharge:   Lines/Drains/Airways       Drain  Duration                 2 days                  Hospital Course: On 1/7/25, Felicity Culp was taken to the operating room and uneventfully underwent the above listed procedure.  Patient was admitted post-operatively for chest tube management and pain control. Overall, brief hospital course was uncomplicated.  On POD 2, CXR and chest tube output were appropriate for tube removal.  At the time of discharge, patient demonstrated tolerance of a regular diet, ability to ambulate  without assistance, appropriate use of an incentive spirometer as instructed, and pain was adequately controlled with a multimodal analgesic regimen.  Patient was cleared for discharge home on POD 2 in good condition.       Goals of Care Treatment Preferences:  Code Status: Full Code          Significant Diagnostic Studies: N/A    Pending Diagnostic Studies:       Procedure Component Value Units Date/Time    Specimen to Pathology, Surgery Pulmonary and Thoracic [[id number]] Collected: 01/07/25 1104    Order Status: Sent Lab Status: In process Updated: 01/07/25 1439    Specimen: Tissue     Specimen to Pathology, Surgery Pulmonary and Thoracic [[id number]] Collected: 01/07/25 1031    Order Status: Sent Lab Status: In process Updated: 01/07/25 1031    Specimen: Tissue     X-Ray Chest AP Portable [[id number]] Resulted: 01/09/25 1141    Order Status: Sent Lab Status: In process Updated: 01/09/25 1142            No new Assessment & Plan notes have been filed under this hospital service since the last note was generated.  Service: Cardiothoracic Surgery    Final Active Diagnoses:    Diagnosis Date Noted POA    PRINCIPAL PROBLEM:  Carcinoid tumor of right lung [D3A.090] 01/07/2025 Yes      Problems Resolved During this Admission:      Discharged Condition: good    Disposition: Home or Self Care    Follow Up:   Follow-up Information       Ke Thompson MD Follow up in 2 week(s).    Specialty: Cardiothoracic Surgery  [facility] information:  [facility] [address]  [phone]                           Patient Instructions:      X-Ray Chest PA And Lateral   Standing Status: Future Standing Exp. Date: 01/09/26     Order Specific Question Answer Comments   May the Radiologist modify the order per protocol to meet the clinical needs of the patient? Yes    Release to patient Immediate      Diet Adult Regular     Other restrictions (specify):   Order Comments: No heavy lifting, pushing, pulling for 2 weeks.  Remove chest tube site dressing in 48 hours. Suture removal at appointment. May shower, but no bath until incisions completely healed.     Notify your health care provider if you experience any of the following:  temperature >100.4     Notify your health care provider if you experience any of the following:  persistent nausea and vomiting or diarrhea     Notify your health care provider if you experience any of the following:  severe uncontrolled pain     Notify your health care provider if you experience any of the following:  redness, tenderness, or signs of infection (pain, swelling, redness, odor or green/yellow discharge around incision site)     Notify your health care provider if you experience any of the following:  difficulty breathing or increased cough     Notify your health care provider if you experience any of the following:  severe persistent headache     Notify your health care provider if you experience any of the following:  worsening rash     Notify your health care provider if you experience any of the following:  persistent dizziness, light-headedness, or visual disturbances     Notify your health care provider if you experience any of the following:  increased confusion or weakness     Remove dressing in 48 hours     Activity as tolerated     Medications:  Reconciled Home Medications:      Medication List        START taking these medications      acetaminophen 325 MG tablet  Commonly known as: TYLENOL  Take 2 tablets (650 mg total) by mouth every 8 (eight) hours. for 7 days     gabapentin 300 MG capsule  Commonly known as: NEURONTIN  Take 1 capsule (300 mg total) by mouth every evening. for 14 days     methocarbamoL 500 MG Tab  Commonly known as: ROBAXIN  Take 1 tablet (500 mg total) by mouth 4 (four) times daily. for 10 days     oxyCODONE 10 mg Tab immediate release tablet  Commonly known as: ROXICODONE  Take 1 tablet (10 mg total) by mouth every 4 (four) hours as needed for Pain.      polyethylene glycol 17 gram Pwpk  Commonly known as: GLYCOLAX  Take 17 g by mouth 2 (two) times daily as needed for Constipation.     potassium chloride SA 20 MEQ tablet  Commonly known as: K-DUR,KLOR-CON  Take 2 tablets (40 mEq total) by mouth once daily. for 4 days     senna-docusate 8.6-50 mg 8.6-50 mg per tablet  Commonly known as: PERICOLACE  Take 1 tablet by mouth 2 (two) times daily. for 14 days            CHANGE how you take these medications      furosemide 20 MG tablet  Commonly known as: LASIX  Take 2 tablets (40 mg total) by mouth once daily. for 4 days  What changed:   how much to take  when to take this            CONTINUE taking these medications      albuterol 90 mcg/actuation inhaler  Commonly known as: PROVENTIL/VENTOLIN HFA     ALPRAZolam 0.25 MG tablet  Commonly known as: XANAX  Take 1 tablet (0.25 mg total) by mouth 3 (three) times daily as needed for Anxiety.     atorvastatin 20 MG tablet  Commonly known as: LIPITOR  TAKE 1 TABLET BY MOUTH DAILY IN THE EVENING AFTER SUPPER     carvediloL 3.125 MG tablet  Commonly known as: COREG     cholecalciferol (vitamin D3) 1,250 mcg (50,000 unit) capsule  Take 1 capsule by mouth every 7 days.     clopidogreL 75 mg tablet  Commonly known as: PLAVIX  Take 75 mg by mouth.     ferrous sulfate 325 mg (65 mg iron) Tab tablet  Commonly known as: FEOSOL  Take 325 mg by mouth daily with breakfast.     levothyroxine 175 MCG tablet  Commonly known as: SYNTHROID, LEVOTHROID  Take 1 tablet by mouth once daily.     losartan 50 MG tablet  Commonly known as: COZAAR  Take 1 tablet by mouth once daily.            STOP taking these medications      cetirizine 10 MG tablet  Commonly known as: ZYRTEC     fluticasone propionate 50 mcg/actuation nasal spray  Commonly known as: FLONASE            ASK your doctor about these medications      metFORMIN 500 MG tablet  Commonly known as: GLUCOPHAGE  TAKE 1 TABLET BY MOUTH DAILY TAKE WITH FOOOD            Time spent on the discharge  of patient: 35 minutes    John Gupta, DO  Cardiothoracic Surgery  [facility]

## 2025-01-09 NOTE — SUBJECTIVE & OBJECTIVE
Interval History: NAEO. Good diuresis.  decreased, no air leak.      Medications:  Continuous Infusions:  Scheduled Meds:   acetaminophen  650 mg Oral Q8H    albuterol-ipratropium  3 mL Nebulization Q4H WAKE    atorvastatin  20 mg Oral QHS    enoxparin  40 mg Subcutaneous Q12H (prophylaxis, 0900/2100)    gabapentin  300 mg Oral QHS    levothyroxine  175 mcg Oral Daily    methocarbamoL  500 mg Oral QID    metoprolol tartrate  12.5 mg Oral BID    polyethylene glycol  17 g Oral Daily    senna-docusate 8.6-50 mg  1 tablet Oral BID     PRN Meds:  Current Facility-Administered Medications:     bisacodyL, 10 mg, Rectal, Daily PRN    ondansetron, 8 mg, Oral, Q8H PRN    oxyCODONE, 5 mg, Oral, Q4H PRN    oxyCODONE, 10 mg, Oral, Q4H PRN     Objective:     Vital Signs (Most Recent):  Temp: 98.5 °F (36.9 °C) (01/09/25 0452)  Pulse: 65 (01/09/25 0716)  Resp: 18 (01/09/25 0716)  BP: 129/75 (01/09/25 0452)  SpO2: 95 % (01/09/25 0716) Vital Signs (24h Range):  Temp:  [98.5 °F (36.9 °C)-99.7 °F (37.6 °C)] 98.5 °F (36.9 °C)  Pulse:  [65-97] 65  Resp:  [14-20] 18  SpO2:  [94 %-97 %] 95 %  BP: (110-147)/(51-76) 129/75     Weight: 130.5 kg (287 lb 11.2 oz)  Body mass index is 47.88 kg/m².    SpO2: 95 %       Intake/Output - Last 3 Shifts         01/07 0700  01/08 0659 01/08 0700  01/09 0659 01/09 0700  01/10 0659    P.O.  720     IV Piggyback 1900      Total Intake(mL/kg) 1900 (14.6) 720 (5.5)     Urine (mL/kg/hr) 200 (0.1) 0 (0)     Stool 0      Chest Tube 270 20     Total Output 470 20     Net +1430 +700            Urine Occurrence  6 x     Stool Occurrence 0 x              Lines/Drains/Airways       Drain  Duration                  Chest Tube 01/07/25 1040 Tube - 1 Right Pleural 24 Fr. 1 day              Peripheral Intravenous Line  Duration                  Peripheral IV - Single Lumen 01/07/25 0605 20 G No Anterior;Left;Proximal Forearm 2 days         Peripheral IV - Single Lumen 01/07/25 0733 16 G Right Other 1 day          Peripheral IV - Single Lumen 01/07/25 0733 20 G Right Hand 1 day                     Physical Exam  Vitals reviewed.   Constitutional:       General: She is not in acute distress.  Cardiovascular:      Rate and Rhythm: Normal rate and regular rhythm.   Pulmonary:      Effort: Pulmonary effort is normal. No respiratory distress.      Comments:  SS, no air leak  Skin:     General: Skin is warm and dry.   Neurological:      General: No focal deficit present.      Mental Status: She is alert.            Significant Labs:  All pertinent labs from the last 24 hours have been reviewed.    Significant Diagnostics:  I have reviewed all pertinent imaging results/findings within the past 24 hours.

## 2025-01-09 NOTE — ASSESSMENT & PLAN NOTE
Felicity Culp is a 69 y.o. female who is now s/p en bloc resection of a RML carcinoid tumor with RUL involvement on 1/7/24     -MMPC  -Chest tube clamp trial with interval CXR  -Metoprolol for A. Fib ppx  -PT  -OOBTC/Ambulate  -IS, scheduled nebs  -CPAP QHS nightly prn  -Potential chest tube removal and DC home following clamp trial

## 2025-01-09 NOTE — NURSING
"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 Prn pain meds given    Goals of Care: Chest tube care, pain mgt, safety and comfort    Neuro: AO x4    Vital Signs: /75   Pulse 91   Temp 98.5 °F (36.9 °C) (Oral)   Resp 15   Ht 5' 5" (1.651 m)   Wt 130.5 kg (287 lb 11.2 oz)   LMP  (LMP Unknown)   SpO2 (!) 94%   Breastfeeding No   BMI 47.88 kg/m²     Respiratory: RA    Diet: Diet Adult Regular      Is patient tolerating current diet? YES     GTTS: NONE    Urine Output/Bowel Movement:   I/O this shift:  In: -   Out: 20 [Chest Tube:20]  Last Bowel Movement: 01/06/25      Drains/Tubes/Tube Feeds (include total output/shift):   I/O this shift:  In: -   Out: 20 [Chest Tube:20]      Lines: PIV X3      Accuchecks: nONE    Skin: Wound  Incision Right upper;lateral Chest laparoscopic punctures     Fall Risk Score: 8    Activity level? SBA    Any scheduled procedures?  none    Any safety concerns? Falls    Other:   "

## 2025-01-09 NOTE — PLAN OF CARE
Problem: Adult Inpatient Plan of Care  Goal: Plan of Care Review  Outcome: Progressing  Goal: Patient-Specific Goal (Individualized)  Outcome: Progressing  Goal: Absence of Hospital-Acquired Illness or Injury  Outcome: Progressing  Goal: Optimal Comfort and Wellbeing  Outcome: Progressing     Problem: Bariatric Environmental Safety  Goal: Safety Maintained with Care  Outcome: Progressing     Problem: Wound  Goal: Optimal Coping  Outcome: Progressing  Goal: Optimal Functional Ability  Outcome: Progressing  Goal: Absence of Infection Signs and Symptoms  Outcome: Progressing  Goal: Improved Oral Intake  Outcome: Progressing  Goal: Optimal Pain Control and Function  Outcome: Progressing  Goal: Skin Health and Integrity  Outcome: Progressing  Goal: Optimal Wound Healing  Outcome: Progressing     Problem: Fall Injury Risk  Goal: Absence of Fall and Fall-Related Injury  Outcome: Progressing

## 2025-01-13 ENCOUNTER — TELEPHONE (OUTPATIENT)
Facility: CLINIC | Age: 70
End: 2025-01-13
Payer: MEDICARE

## 2025-01-13 NOTE — NURSING
"Oncology Navigation   Intake      Treatment                              Acuity      Follow Up  No follow-ups on file.     Spoke with patient who stated she is "doing as well as can be expected" after having surgery last week. Patient states she has pain medicine to help with post-op pain and she has her niece (John Culp) with her to help her as she recovers. Reviewed with patient her upcoming appointments and she verbalized understanding.  "

## 2025-01-14 LAB
FINAL PATHOLOGIC DIAGNOSIS: NORMAL
GROSS: NORMAL
Lab: NORMAL
MICROSCOPIC EXAM: NORMAL

## 2025-01-21 ENCOUNTER — PATIENT MESSAGE (OUTPATIENT)
Dept: PULMONOLOGY | Facility: CLINIC | Age: 70
End: 2025-01-21
Payer: MEDICARE

## 2025-01-21 ENCOUNTER — TELEPHONE (OUTPATIENT)
Dept: PULMONOLOGY | Facility: CLINIC | Age: 70
End: 2025-01-21
Payer: MEDICARE

## 2025-01-21 NOTE — TELEPHONE ENCOUNTER
Attempted to contact patient to reschedule 1/22 appointment, no answer, line continues to ring with no vm available.

## 2025-01-23 NOTE — PROGRESS NOTES
"Subjective     Patient ID: Felicity Culp is a 69 y.o. female.    Chief Complaint: No chief complaint on file.    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 Margins negative. Levels 4,7,9,10 = negative (0/8)     Post-operative therapy: none      HPI  Patient is a 69 y.o. female never smoker with SALIMA, HTN, acquired hypothyrosidm here today for follow-up from RML wedge resection. Uncomplicated post-operative course. Discharged POD 2. Patient is doing well today. Pain is well controlled. Occasional dyspnea, though this is improved. She does report a persistent cough. Of note she was discharged on lasix and has taken this medication in the past "as needed".         Review of Systems   Constitutional: Negative.    Respiratory:  Positive for cough and shortness of breath.    Cardiovascular: Negative.    Gastrointestinal: Negative.    Neurological:  Positive for numbness.   Hematological: Negative.    Psychiatric/Behavioral: Negative.            Objective     There were no vitals filed for this visit.      Physical Exam  Constitutional:       Appearance: Normal appearance.   HENT:      Head: Normocephalic and atraumatic.   Cardiovascular:      Rate and Rhythm: Normal rate.      Pulses: Normal pulses.      Heart sounds: Normal heart sounds.   Pulmonary:      Effort: Pulmonary effort is normal.      Breath sounds: Normal breath sounds.      Comments: Stitch removed, wounds healing well  Skin:     General: Skin is warm and dry.   Neurological:      General: No focal deficit present.      Mental Status: She is alert and oriented to person, place, and time.         CXR 1/31/25:  Impression:     1.  Findings suggestive of mild pulmonary vascular congestion/trace edema, with a trace right pleural effusions/atelectasis.     2.  Ill-defined nodular density in " the anterior right middle lobe, better appreciated on the previous PET-CT.     Assessment and Plan     Patient is a 69 y.o. female never smoker with SALIMA, HTN, acquired hypothyrosidm here today for follow-up from RML wedge resection. Doing well. Mild cough in the setting of mild pulmonary congestion.    - f/u in 6 months with non contrast CT scan   - patient instructed to continue lasix until her visit with her PCP in a couple of weeks.

## 2025-01-24 ENCOUNTER — TELEPHONE (OUTPATIENT)
Facility: CLINIC | Age: 70
End: 2025-01-24
Payer: MEDICARE

## 2025-01-24 NOTE — NURSING
Oncology Navigation   Intake      Treatment                              Acuity      Follow Up  No follow-ups on file.     Patient called and asked if I could help her reschedule her Pulmonology appt which got cancelled this week d/t to ice/snow weather and associated travel restrictions. Message sent to provider. Will continue to follow.

## 2025-01-31 ENCOUNTER — HOSPITAL ENCOUNTER (OUTPATIENT)
Dept: RADIOLOGY | Facility: HOSPITAL | Age: 70
Discharge: HOME | End: 2025-01-31
Attending: STUDENT IN AN ORGANIZED HEALTH CARE EDUCATION/TRAINING PROGRAM
Payer: MEDICARE

## 2025-01-31 ENCOUNTER — TELEPHONE (OUTPATIENT)
Facility: CLINIC | Age: 70
End: 2025-01-31
Payer: MEDICARE

## 2025-01-31 ENCOUNTER — OFFICE VISIT (OUTPATIENT)
Facility: CLINIC | Age: 70
End: 2025-01-31
Payer: MEDICARE

## 2025-01-31 VITALS
RESPIRATION RATE: 17 BRPM | WEIGHT: 287.63 LBS | BODY MASS INDEX: 47.86 KG/M2 | SYSTOLIC BLOOD PRESSURE: 157 MMHG | HEART RATE: 106 BPM | DIASTOLIC BLOOD PRESSURE: 76 MMHG | TEMPERATURE: 98 F

## 2025-01-31 DIAGNOSIS — D3A.090 CARCINOID TUMOR OF LUNG, UNSPECIFIED WHETHER MALIGNANT: ICD-10-CM

## 2025-01-31 DIAGNOSIS — C34.90 MALIGNANT NEOPLASM OF LUNG, UNSPECIFIED LATERALITY, UNSPECIFIED PART OF LUNG: Primary | ICD-10-CM

## 2025-01-31 DIAGNOSIS — D3A.090 CARCINOID TUMOR DETERMINED BY BIOPSY OF LUNG: Primary | ICD-10-CM

## 2025-01-31 PROCEDURE — 71046 X-RAY EXAM CHEST 2 VIEWS: CPT | Mod: TC,PO

## 2025-01-31 PROCEDURE — 99024 POSTOP FOLLOW-UP VISIT: CPT | Mod: S$GLB,,, | Performed by: STUDENT IN AN ORGANIZED HEALTH CARE EDUCATION/TRAINING PROGRAM

## 2025-01-31 PROCEDURE — 3078F DIAST BP <80 MM HG: CPT | Mod: CPTII,S$GLB,, | Performed by: STUDENT IN AN ORGANIZED HEALTH CARE EDUCATION/TRAINING PROGRAM

## 2025-01-31 PROCEDURE — 1159F MED LIST DOCD IN RCRD: CPT | Mod: CPTII,S$GLB,, | Performed by: STUDENT IN AN ORGANIZED HEALTH CARE EDUCATION/TRAINING PROGRAM

## 2025-01-31 PROCEDURE — 1125F AMNT PAIN NOTED PAIN PRSNT: CPT | Mod: CPTII,S$GLB,, | Performed by: STUDENT IN AN ORGANIZED HEALTH CARE EDUCATION/TRAINING PROGRAM

## 2025-01-31 PROCEDURE — 99999 PR PBB SHADOW E&M-EST. PATIENT-LVL III: CPT | Mod: PBBFAC,,, | Performed by: STUDENT IN AN ORGANIZED HEALTH CARE EDUCATION/TRAINING PROGRAM

## 2025-01-31 PROCEDURE — 71046 X-RAY EXAM CHEST 2 VIEWS: CPT | Mod: 26,,, | Performed by: RADIOLOGY

## 2025-01-31 PROCEDURE — 3077F SYST BP >= 140 MM HG: CPT | Mod: CPTII,S$GLB,, | Performed by: STUDENT IN AN ORGANIZED HEALTH CARE EDUCATION/TRAINING PROGRAM

## 2025-01-31 RX ORDER — GABAPENTIN 300 MG/1
300 CAPSULE ORAL 2 TIMES DAILY
Qty: 60 CAPSULE | Refills: 11 | Status: SHIPPED | OUTPATIENT
Start: 2025-01-31 | End: 2026-01-31

## 2025-03-10 ENCOUNTER — PATIENT MESSAGE (OUTPATIENT)
Dept: RESEARCH | Facility: HOSPITAL | Age: 70
End: 2025-03-10
Payer: MEDICARE

## 2025-07-18 ENCOUNTER — TELEPHONE (OUTPATIENT)
Facility: CLINIC | Age: 70
End: 2025-07-18

## 2025-07-18 ENCOUNTER — HOSPITAL ENCOUNTER (OUTPATIENT)
Dept: RADIOLOGY | Facility: HOSPITAL | Age: 70
Discharge: HOME | End: 2025-07-18
Attending: STUDENT IN AN ORGANIZED HEALTH CARE EDUCATION/TRAINING PROGRAM
Payer: MEDICARE

## 2025-07-18 ENCOUNTER — OFFICE VISIT (OUTPATIENT)
Facility: CLINIC | Age: 70
End: 2025-07-18
Payer: MEDICARE

## 2025-07-18 VITALS
OXYGEN SATURATION: 96 % | TEMPERATURE: 98 F | DIASTOLIC BLOOD PRESSURE: 70 MMHG | WEIGHT: 281 LBS | SYSTOLIC BLOOD PRESSURE: 159 MMHG | HEART RATE: 80 BPM | BODY MASS INDEX: 46.76 KG/M2 | RESPIRATION RATE: 18 BRPM

## 2025-07-18 DIAGNOSIS — C34.90 MALIGNANT NEOPLASM OF LUNG, UNSPECIFIED LATERALITY, UNSPECIFIED PART OF LUNG: ICD-10-CM

## 2025-07-18 DIAGNOSIS — C34.90 MALIGNANT NEOPLASM OF LUNG, UNSPECIFIED LATERALITY, UNSPECIFIED PART OF LUNG: Primary | ICD-10-CM

## 2025-07-18 DIAGNOSIS — D3A.090 CARCINOID TUMOR DETERMINED BY BIOPSY OF LUNG: Primary | ICD-10-CM

## 2025-07-18 PROCEDURE — 99999 PR PBB SHADOW E&M-EST. PATIENT-LVL III: CPT | Mod: PBBFAC,,, | Performed by: STUDENT IN AN ORGANIZED HEALTH CARE EDUCATION/TRAINING PROGRAM

## 2025-07-18 PROCEDURE — 71250 CT THORAX DX C-: CPT | Mod: TC

## 2025-07-18 PROCEDURE — 71250 CT THORAX DX C-: CPT | Mod: 26,,, | Performed by: RADIOLOGY

## 2025-07-18 NOTE — PROGRESS NOTES
Thoracic Surgery Progress Note - Former[facility], later [facility]    Subjective     Patient ID: Felicity Culp is a 69 y.o. female.    Chief Complaint: No chief complaint on file.    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 Margins negative. Levels 4,7,9,10 = negative (0/8)     Post-operative therapy: none      HPI  Patient is a 69 y.o. female never smoker with SALIMA, HTN, acquired hypothyrosidm here today for follow-up from RML wedge resection. Uncomplicated post-operative course. Discharged POD 2. Patient is doing well today. Pain is well controlled. Occasional dyspnea, though this is continually improving. Denies persistent cough. Patient is doing well. Pain controlled. Denies SOB.        Review of Systems   Constitutional: Negative.    Respiratory:  Positive for shortness of breath. Negative for cough.    Cardiovascular: Negative.    Gastrointestinal: Negative.    Neurological:  Positive for numbness.   Hematological: Negative.    Psychiatric/Behavioral: Negative.            Objective     There were no vitals filed for this visit.      Physical Exam  Constitutional:       Appearance: Normal appearance.   HENT:      Head: Normocephalic and atraumatic.   Cardiovascular:      Rate and Rhythm: Normal rate.      Pulses: Normal pulses.      Heart sounds: Normal heart sounds.   Pulmonary:      Effort: Pulmonary effort is normal.      Breath sounds: Normal breath sounds.      Comments: Stitch removed, wounds healing well  Skin:     General: Skin is warm and dry.   Neurological:      General: No focal deficit present.      Mental Status: She is alert and oriented to person, place, and time.         CXR 1/31/25:  1.  Findings suggestive of mild pulmonary vascular congestion/trace edema, with a trace right pleural effusions/atelectasis.  2.  Ill-defined  nodular density in the anterior right middle lobe, better appreciated on the previous PET-CT.    CT chest 7/18/25:     Impression:     1.  Interval resection of the pulmonary nodule in the anterior right upper lobe, with bandlike scarring in the anterior right upper lobe.     2.  No new the concerning for enlarging pulmonary nodules/masses identified.     3.  No pathologic lymphadenopathy.    Assessment and Plan     Patient is a 69 y.o. female never smoker with SALIMA, HTN, acquired hypothyrosidm here today for follow-up from RML wedge resection with final pathology of atypical carcinoid. Doing well at 6 month follow-up CT reviewed with no concerning abnormalities    - f/u in 6 months with non contrast CT scan

## (undated) DEVICE — OBTURATOR BLADELESS 8MM XI CLR

## (undated) DEVICE — DRESSING SURGICAL 1X3

## (undated) DEVICE — DRESSING TRANS 2X2 TEGADERM

## (undated) DEVICE — Device

## (undated) DEVICE — GAUZE DRAIN N WVN 6PLY 4X4IN

## (undated) DEVICE — HEMOSTAT SURGICEL NUKNIT 3X4IN

## (undated) DEVICE — NDL SPINAL 18GX3.5 SPINOCAN

## (undated) DEVICE — GOWN SMART IMP BREATHABLE XXLG

## (undated) DEVICE — SYR ONLY LUER LOCK 20CC

## (undated) DEVICE — DRAPE ABDOMINAL TIBURON 14X11

## (undated) DEVICE — BAG TISS RETRV MONARCH 10MM

## (undated) DEVICE — SOL WATER STRL IRR 1000ML

## (undated) DEVICE — LOOP VESSEL BLUE MAXI

## (undated) DEVICE — ELECTRODE BLADE INSULATED 1 IN

## (undated) DEVICE — DRAPE COLUMN DAVINCI XI

## (undated) DEVICE — DRAPE ARM DAVINCI XI

## (undated) DEVICE — CANNULA REDUCER 12-8MM

## (undated) DEVICE — TRAY MINOR GEN SURG OMC

## (undated) DEVICE — TUBING SUC UNIV W/CONN 12FT

## (undated) DEVICE — KIT ANTIFOG W/SPONG & FLUID

## (undated) DEVICE — COVER LIGHT HANDLE

## (undated) DEVICE — SYR SLIP TIP 20CC

## (undated) DEVICE — DRAPE INCISE IOBAN 2 23X17IN

## (undated) DEVICE — SUT SILK 0 STRANDS 30IN BLK

## (undated) DEVICE — STAPLER SUREFORM CRVD TIP 45

## (undated) DEVICE — DRAPE SCOPE PILLOW WARMER

## (undated) DEVICE — DRESSING TRANS 4X4 TEGADERM

## (undated) DEVICE — SUT VICRYL PLUS 2-0 CT1 18

## (undated) DEVICE — SUT MCRYL PLUS 4-0 PS2 27IN

## (undated) DEVICE — SET TRI-LUMEN FILTERED TUBE

## (undated) DEVICE — SUT MONOCRYL UD 4-0 27 PS-1

## (undated) DEVICE — SUT 2-0 VICRYL / CT-1

## (undated) DEVICE — CANNULA SEAL 12MM

## (undated) DEVICE — ELECTRODE REM PLYHSV RETURN 9

## (undated) DEVICE — RELOAD SUREFORM 45 4.3 GRN 6R

## (undated) DEVICE — DRAIN CHEST DRY SUCTION

## (undated) DEVICE — DRAIN CHAN RND HUBLS 8MM 24FR

## (undated) DEVICE — PORT AIRSEAL 12/120MM LPI

## (undated) DEVICE — GLOVE BIOGEL ORTHOPEDIC 7.5

## (undated) DEVICE — SUT 0 VICRYL / CT-1

## (undated) DEVICE — TAPE SILK 3IN

## (undated) DEVICE — SUT 0 30IN ETHIBOND EXCEL G

## (undated) DEVICE — CONTAINER SPECIMEN OR STER 4OZ

## (undated) DEVICE — SET TUBE DAVINCI 5 HI FLOW INS

## (undated) DEVICE — SEAL UNIVERSAL 5MM-8MM XI